# Patient Record
Sex: MALE | Race: WHITE | NOT HISPANIC OR LATINO | Employment: FULL TIME | URBAN - METROPOLITAN AREA
[De-identification: names, ages, dates, MRNs, and addresses within clinical notes are randomized per-mention and may not be internally consistent; named-entity substitution may affect disease eponyms.]

---

## 2012-02-10 LAB — HIV 1&2 EXT: NORMAL

## 2021-09-08 ENCOUNTER — TRANSFERRED RECORDS (OUTPATIENT)
Dept: MULTI SPECIALTY CLINIC | Facility: CLINIC | Age: 51
End: 2021-09-08

## 2023-01-10 ENCOUNTER — OFFICE VISIT (OUTPATIENT)
Dept: FAMILY MEDICINE | Facility: CLINIC | Age: 53
End: 2023-01-10
Payer: COMMERCIAL

## 2023-01-10 VITALS
BODY MASS INDEX: 45.2 KG/M2 | RESPIRATION RATE: 16 BRPM | TEMPERATURE: 98.2 F | HEART RATE: 88 BPM | SYSTOLIC BLOOD PRESSURE: 130 MMHG | HEIGHT: 67 IN | WEIGHT: 288 LBS | OXYGEN SATURATION: 97 % | DIASTOLIC BLOOD PRESSURE: 84 MMHG

## 2023-01-10 DIAGNOSIS — Z13.220 SCREENING FOR HYPERLIPIDEMIA: ICD-10-CM

## 2023-01-10 DIAGNOSIS — E66.01 MORBID OBESITY (H): ICD-10-CM

## 2023-01-10 DIAGNOSIS — I10 ESSENTIAL HYPERTENSION: Primary | ICD-10-CM

## 2023-01-10 DIAGNOSIS — Z13.0 SCREENING FOR DEFICIENCY ANEMIA: ICD-10-CM

## 2023-01-10 DIAGNOSIS — Z12.5 SCREENING FOR PROSTATE CANCER: ICD-10-CM

## 2023-01-10 DIAGNOSIS — Z13.1 SCREENING FOR DIABETES MELLITUS: ICD-10-CM

## 2023-01-10 DIAGNOSIS — R06.83 SNORES: ICD-10-CM

## 2023-01-10 LAB
ALBUMIN SERPL-MCNC: 3.1 G/DL (ref 3.4–5)
ALP SERPL-CCNC: 55 U/L (ref 40–150)
ALT SERPL W P-5'-P-CCNC: 64 U/L (ref 0–70)
ANION GAP SERPL CALCULATED.3IONS-SCNC: 7 MMOL/L (ref 3–14)
AST SERPL W P-5'-P-CCNC: 58 U/L (ref 0–45)
BILIRUB SERPL-MCNC: 0.6 MG/DL (ref 0.2–1.3)
BUN SERPL-MCNC: 20 MG/DL (ref 7–30)
CALCIUM SERPL-MCNC: 9.8 MG/DL (ref 8.5–10.1)
CHLORIDE BLD-SCNC: 96 MMOL/L (ref 94–109)
CHOLEST SERPL-MCNC: 184 MG/DL
CO2 SERPL-SCNC: 33 MMOL/L (ref 20–32)
CREAT SERPL-MCNC: 0.97 MG/DL (ref 0.66–1.25)
ERYTHROCYTE [DISTWIDTH] IN BLOOD BY AUTOMATED COUNT: 12.6 % (ref 10–15)
FASTING STATUS PATIENT QL REPORTED: YES
GFR SERPL CREATININE-BSD FRML MDRD: >90 ML/MIN/1.73M2
GLUCOSE BLD-MCNC: 112 MG/DL (ref 70–99)
HCT VFR BLD AUTO: 46.8 % (ref 40–53)
HDLC SERPL-MCNC: 42 MG/DL
HGB BLD-MCNC: 15.6 G/DL (ref 13.3–17.7)
LDLC SERPL CALC-MCNC: 116 MG/DL
MCH RBC QN AUTO: 30.7 PG (ref 26.5–33)
MCHC RBC AUTO-ENTMCNC: 33.3 G/DL (ref 31.5–36.5)
MCV RBC AUTO: 92 FL (ref 78–100)
NONHDLC SERPL-MCNC: 142 MG/DL
PLATELET # BLD AUTO: 213 10E3/UL (ref 150–450)
POTASSIUM BLD-SCNC: 3.6 MMOL/L (ref 3.4–5.3)
PROT SERPL-MCNC: 7.7 G/DL (ref 6.8–8.8)
PSA SERPL-MCNC: 0.95 UG/L (ref 0–4)
RBC # BLD AUTO: 5.08 10E6/UL (ref 4.4–5.9)
SODIUM SERPL-SCNC: 136 MMOL/L (ref 133–144)
TRIGL SERPL-MCNC: 131 MG/DL
WBC # BLD AUTO: 9.9 10E3/UL (ref 4–11)

## 2023-01-10 PROCEDURE — 80053 COMPREHEN METABOLIC PANEL: CPT | Performed by: PHYSICIAN ASSISTANT

## 2023-01-10 PROCEDURE — 80061 LIPID PANEL: CPT | Performed by: PHYSICIAN ASSISTANT

## 2023-01-10 PROCEDURE — 36415 COLL VENOUS BLD VENIPUNCTURE: CPT | Performed by: PHYSICIAN ASSISTANT

## 2023-01-10 PROCEDURE — G0103 PSA SCREENING: HCPCS | Performed by: PHYSICIAN ASSISTANT

## 2023-01-10 PROCEDURE — 85027 COMPLETE CBC AUTOMATED: CPT | Performed by: PHYSICIAN ASSISTANT

## 2023-01-10 PROCEDURE — 99203 OFFICE O/P NEW LOW 30 MIN: CPT | Performed by: PHYSICIAN ASSISTANT

## 2023-01-10 RX ORDER — LISINOPRIL 20 MG/1
20 TABLET ORAL DAILY
COMMUNITY
Start: 2022-07-06 | End: 2023-01-10

## 2023-01-10 RX ORDER — CHLORTHALIDONE 25 MG/1
25 TABLET ORAL DAILY
COMMUNITY
Start: 2022-07-06 | End: 2023-01-10

## 2023-01-10 RX ORDER — CHLORTHALIDONE 25 MG/1
25 TABLET ORAL DAILY
Qty: 90 TABLET | Refills: 1 | Status: SHIPPED | OUTPATIENT
Start: 2023-01-10 | End: 2023-07-05

## 2023-01-10 RX ORDER — LISINOPRIL 20 MG/1
20 TABLET ORAL DAILY
Qty: 90 TABLET | Refills: 1 | Status: SHIPPED | OUTPATIENT
Start: 2023-01-10 | End: 2023-07-05

## 2023-01-10 ASSESSMENT — PAIN SCALES - GENERAL: PAINLEVEL: NO PAIN (0)

## 2023-01-10 NOTE — PROGRESS NOTES
"  Assessment & Plan     Essential hypertension  Restarting medications, we discussed starting 1 versus 2 meds patient would prefer to go back on both meds.  - Comprehensive metabolic panel; Future  - lisinopril (ZESTRIL) 20 MG tablet; Take 1 tablet (20 mg) by mouth daily Take 20 mg by mouth daily  - chlorthalidone (HYGROTON) 25 MG tablet; Take 1 tablet (25 mg) by mouth daily Take 25 mg by mouth daily  - Comprehensive metabolic panel  Needs labs repeated in 2 weeks and also blood pressure recheck in 2 weeks he will monitor blood pressures at home as well  Screening for diabetes mellitus  Pending  - Comprehensive metabolic panel; Future  - Comprehensive metabolic panel    Screening for prostate cancer  Pending  - Prostate Specific Antigen Screen; Future  - Prostate Specific Antigen Screen    Morbid obesity (H)  We discussed weight loss and the improvements that would make in his blood pressure control and suspected sleep apnea he is going to improve his diet and exercise routinely once again  - Adult Sleep Eval & Management  Referral; Future    Screening for deficiency anemia    - CBC with platelets; Future  - CBC with platelets    Snores  He will be called to schedule  - Adult Sleep Eval & Management  Referral; Future    Screening for hyperlipidemia  Patient prefers lifestyle changes to medications  - Lipid panel reflex to direct LDL Fasting; Future  - Lipid panel reflex to direct LDL Fasting       BMI:   Estimated body mass index is 45.11 kg/m  as calculated from the following:    Height as of this encounter: 1.702 m (5' 7\").    Weight as of this encounter: 130.6 kg (288 lb).   Weight management plan: Discussed healthy diet and exercise guidelines        Return in about 2 weeks (around 1/24/2023) for BP Recheck.    Latricia Jewell PA-C  [facility] ANEL Hickey is a 52 year old, presenting for the following health issues: New patient, he no longer sees previous clinic " due to insurance changes  Hypertension      History of Present Illness       Hypertension: He presents for follow up of hypertension.  He does check blood pressure  regularly outside of the clinic. Outside blood pressures have been over 140/90. He does not follow a low salt diet.       Has been seeing a provider [facility]    First of the year started not feeling the best. Last week it got worse. Lots of aches throughout his body. Friday, 4 days was pretty bed ridden--he had myalgias likely had fevers and sweats. No other symptoms other then just feeling really tired and achy.  He is feeling much improved though still not feeling not him self.  He did 2 COVID test both of which were negative    His Rx for his BP ran out in the fall 2022 and has been taking his BP at home and its been more elevated.   He is interested in restarting his medications.  Was on Lisinopril 20 mg and HCL 25mg  BP was in a normal range.      Concerned about sleep apnea.  He would like to just start a CPAP machine as he is losing weight.  He has never had a sleep study.  His fiancée does notice some apnea.  He apparently wakes up gasping.  He will feel fairly well rested upon waking but then later in the day if he is seated watching TV he may fall asleep.  He does snore as well.    He admits he needs to lose weight.  He states he does focus very hard on weight loss for a period of time, such as a month or 2.  Now he is realizing he really needs to do lifestyle changes long-term.  Last January he reports a 26 pound weight loss with his weight loss he did notice improvements to his sleep quality.  Over the summer he walked 4 miles a day and was feeling well.  He does plan to restart diet and exercise once again        Review of Systems   CONSTITUTIONAL: Positive for sweats and chills and myalgias last week improving now  ENT/MOUTH: NEGATIVE for ear, mouth and throat problems  RESP: NEGATIVE for significant cough or SOB  CV: NEGATIVE for  "chest pain, palpitations or peripheral edema  GI: NEGATIVE for nausea, abdominal pain, heartburn, or change in bowel habits  : negative for dysuria, hematuria, decreased urinary stream, erectile dysfunction        Objective    /84   Pulse 88   Temp 98.2  F (36.8  C) (Temporal)   Resp 16   Ht 1.702 m (5' 7\")   Wt 130.6 kg (288 lb)   SpO2 97%   BMI 45.11 kg/m    Body mass index is 45.11 kg/m .  Physical Exam   GENERAL: healthy, alert and no distress  HENT: ear canals and TM's normal, nose and mouth without ulcers or lesions  NECK: no adenopathy, no asymmetry, masses, or scars and thyroid normal to palpation  RESP: lungs clear to auscultation - no rales, rhonchi or wheezes  CV: regular rate and rhythm, normal S1 S2, no S3 or S4, no murmur, click or rub, no peripheral edema and peripheral pulses strong  ABDOMEN: soft, nontender, no hepatosplenomegaly, no masses and bowel sounds normal  MS: no gross musculoskeletal defects noted, no edema  NEURO: Normal strength and tone, mentation intact and speech normal  PSYCH: mentation appears normal, affect normal/bright    Results for orders placed or performed in visit on 01/10/23   CBC with platelets     Status: Normal   Result Value Ref Range    WBC Count 9.9 4.0 - 11.0 10e3/uL    RBC Count 5.08 4.40 - 5.90 10e6/uL    Hemoglobin 15.6 13.3 - 17.7 g/dL    Hematocrit 46.8 40.0 - 53.0 %    MCV 92 78 - 100 fL    MCH 30.7 26.5 - 33.0 pg    MCHC 33.3 31.5 - 36.5 g/dL    RDW 12.6 10.0 - 15.0 %    Platelet Count 213 150 - 450 10e3/uL                   "

## 2023-01-18 ENCOUNTER — DOCUMENTATION ONLY (OUTPATIENT)
Dept: LAB | Facility: CLINIC | Age: 53
End: 2023-01-18
Payer: COMMERCIAL

## 2023-01-18 DIAGNOSIS — I10 ESSENTIAL HYPERTENSION: Primary | ICD-10-CM

## 2023-01-18 NOTE — PROGRESS NOTES
Patient is coming to lab 1/24 without any available orders. Please place lab orders for patient and sign/renew HMPO.     Thanks,     Taniya HENDERSON) [facility]

## 2023-04-23 ENCOUNTER — HEALTH MAINTENANCE LETTER (OUTPATIENT)
Age: 53
End: 2023-04-23

## 2023-05-18 PROBLEM — E66.01 MORBID OBESITY (H): Chronic | Status: ACTIVE | Noted: 2023-01-10

## 2023-05-18 ASSESSMENT — SLEEP AND FATIGUE QUESTIONNAIRES
HOW LIKELY ARE YOU TO NOD OFF OR FALL ASLEEP WHILE SITTING QUIETLY AFTER LUNCH WITHOUT ALCOHOL: SLIGHT CHANCE OF DOZING
HOW LIKELY ARE YOU TO NOD OFF OR FALL ASLEEP WHILE SITTING AND READING: HIGH CHANCE OF DOZING
HOW LIKELY ARE YOU TO NOD OFF OR FALL ASLEEP IN A CAR, WHILE STOPPED FOR A FEW MINUTES IN TRAFFIC: WOULD NEVER DOZE
HOW LIKELY ARE YOU TO NOD OFF OR FALL ASLEEP WHILE SITTING AND TALKING TO SOMEONE: WOULD NEVER DOZE
HOW LIKELY ARE YOU TO NOD OFF OR FALL ASLEEP WHILE SITTING INACTIVE IN A PUBLIC PLACE: SLIGHT CHANCE OF DOZING
HOW LIKELY ARE YOU TO NOD OFF OR FALL ASLEEP WHILE WATCHING TV: MODERATE CHANCE OF DOZING
HOW LIKELY ARE YOU TO NOD OFF OR FALL ASLEEP WHILE LYING DOWN TO REST IN THE AFTERNOON WHEN CIRCUMSTANCES PERMIT: HIGH CHANCE OF DOZING
HOW LIKELY ARE YOU TO NOD OFF OR FALL ASLEEP WHEN YOU ARE A PASSENGER IN A CAR FOR AN HOUR WITHOUT A BREAK: SLIGHT CHANCE OF DOZING

## 2023-05-19 ENCOUNTER — OFFICE VISIT (OUTPATIENT)
Dept: SLEEP MEDICINE | Facility: CLINIC | Age: 53
End: 2023-05-19
Attending: PHYSICIAN ASSISTANT
Payer: COMMERCIAL

## 2023-05-19 VITALS — OXYGEN SATURATION: 95 % | HEART RATE: 79 BPM | BODY MASS INDEX: 44.67 KG/M2 | HEIGHT: 67 IN | WEIGHT: 284.6 LBS

## 2023-05-19 DIAGNOSIS — G47.30 SLEEP APNEA, UNSPECIFIED TYPE: ICD-10-CM

## 2023-05-19 DIAGNOSIS — R53.83 MALAISE AND FATIGUE: ICD-10-CM

## 2023-05-19 DIAGNOSIS — E66.01 CLASS 3 SEVERE OBESITY DUE TO EXCESS CALORIES WITH SERIOUS COMORBIDITY AND BODY MASS INDEX (BMI) OF 40.0 TO 44.9 IN ADULT (H): ICD-10-CM

## 2023-05-19 DIAGNOSIS — R06.83 SNORES: ICD-10-CM

## 2023-05-19 DIAGNOSIS — R06.89 DYSPNEA AND RESPIRATORY ABNORMALITY: Primary | ICD-10-CM

## 2023-05-19 DIAGNOSIS — Z72.820 LACK OF ADEQUATE SLEEP: ICD-10-CM

## 2023-05-19 DIAGNOSIS — R06.00 DYSPNEA AND RESPIRATORY ABNORMALITY: Primary | ICD-10-CM

## 2023-05-19 DIAGNOSIS — R53.81 MALAISE AND FATIGUE: ICD-10-CM

## 2023-05-19 DIAGNOSIS — E66.813 CLASS 3 SEVERE OBESITY DUE TO EXCESS CALORIES WITH SERIOUS COMORBIDITY AND BODY MASS INDEX (BMI) OF 40.0 TO 44.9 IN ADULT (H): ICD-10-CM

## 2023-05-19 DIAGNOSIS — I10 ESSENTIAL HYPERTENSION: ICD-10-CM

## 2023-05-19 PROCEDURE — 99204 OFFICE O/P NEW MOD 45 MIN: CPT | Performed by: PHYSICIAN ASSISTANT

## 2023-05-19 NOTE — PATIENT INSTRUCTIONS
"Medicare and Medicaid patients starting on CPAP or biPAP on or after 5/12/2023  Medicare patients should schedule an IN PERSON CLINIC appointment to provide your CPAP/biPAP usage data to a provider within 90 days of starting CPAP  Virtual visits are no longer allowed for this visit for Medicare                MY TREATMENT INFORMATION FOR SLEEP APNEA-  Ruperto Mcbride    DOCTOR : Christy Welsh PA    Am I having a sleep study at a sleep center?  --->Due to normal delays, you will be contacted within 2-4 weeks to schedule    Am I having a home sleep study?  --->Watch the video for the device you are using:    -/drop off device-   https://www.[employer].com/watch?v=yGGFBdELGhk    -Disposable device sent out require phone/computer application-   https://www.[employer].com/watch?v=BCce_vbiwxE      Frequently asked questions:  1. What is Obstructive Sleep Apnea (JERE)? JERE is the most common type of sleep apnea. Apnea means, \"without breath.\"  Apnea is most often caused by narrowing or collapse of the upper airway as muscles relax during sleep.   Almost everyone has occasional apneas. Most people with sleep apnea have had brief interruptions at night frequently for many years.  The severity of sleep apnea is related to how frequent and severe the events are.   2. What are the consequences of JERE? Symptoms include: feeling sleepy during the day, snoring loudly, gasping or stopping of breathing, trouble sleeping, and occasionally morning headaches or heartburn at night.  Sleepiness can be serious and even increase the risk of falling asleep while driving. Other health consequences may include development of high blood pressure and other cardiovascular disease in persons who are susceptible. Untreated JERE  can contribute to heart disease, stroke and diabetes.   3. What are the treatment options? In most situations, sleep apnea is a lifelong disease that must be managed with daily therapy. Medications are not effective for " sleep apnea and surgery is generally not considered until other therapies have been tried. Your treatment is your choice . Continuous Positive Airway (CPAP) works right away and is the therapy that is effective in nearly everyone. An oral device to hold your jaw forward is usually the next most reliable option. Other options include postioning devices (to keep you off your back), weight loss, and surgery including a tongue pacing device. There is more detail about some of these options below.  4. Are my sleep studies covered by insurance? Although we will request verification of coverage, we advise you also check in advance of the study to ensure there is coverage.    Important tips for those choosing CPAP and similar devices  For new devices, sign up for device OSVALDO to monitor your device for your followup visits  We encourage you to utilize the [employer] osvaldo or website (myAir web (resmed.com) ) to monitor your therapy progress and share the data with your healthcare team when you discuss your sleep apnea.                                                    Know your equipment:  CPAP is continuous positive airway pressure that prevents obstructive sleep apnea by keeping the throat from collapsing while you are sleeping. In most cases, the device is  smart  and can slowly self-adjusts if your throat collapses and keeps a record every day of how well you are treated-this information is available to you and your care team.  BPAP is bilevel positive airway pressure that keeps your throat open and also assists each breath with a pressure boost to maintain adequate breathing.  Special kinds of BPAP are used in patients who have inadequate breathing from lung or heart disease. In most cases, the device is  smart  and can slowly self-adjusts to assist breathing. Like CPAP, the device keeps a record of how well you are treated.  Your mask is your connection to the device. You get to choose what feels most comfortable  and the staff will help to make sure if fits. Here: are some examples of the different masks that are available:       Key points to remember on your journey with sleep apnea:  Sleep study.  PAP devices often need to be adjusted during a sleep study to show that they are effective and adjusted right.  Good tips to remember: Try wearing just the mask during a quiet time during the day so your body adapts to wearing it. A humidifier is recommended for comfort in most cases to prevent drying of your nose and throat. Allergy medication from your provider may help you if you are having nasal congestion.  Getting settled-in. It takes more than one night for most of us to get used to wearing a mask. Try wearing just the mask during a quiet time during the day so your body adapts to wearing it. A humidifier is recommended for comfort in most cases. Our team will work with you carefully on the first day and will be in contact within 4 days and again at 2 and 4 weeks for advice and remote device adjustments. Your therapy is evaluated by the device each day.   Use it every night. The more you are able to sleep naturally for 7-8 hours, the more likely you will have good sleep and to prevent health risks or symptoms from sleep apnea. Even if you use it 4 hours it helps. Occasionally all of us are unable to use a medical therapy, in sleep apnea, it is not dangerous to miss one night.   Communicate. Call our skilled team on the number provided on the first day if your visit for problems that make it difficult to wear the device. Over 2 out of 3 patients can learn to wear the device long-term with help from our team. Remember to call our team or your sleep providers if you are unable to wear the device as we may have other solutions for those who cannot adapt to mask CPAP therapy. It is recommended that you sleep your sleep provider within the first 3 months and yearly after that if you are not having problems.   Use it for your  health. We encourage use of CPAP masks during daytime quiet periods to allow your face and brain to adapt to the sensation of CPAP so that it will be a more natural sensation to awaken to at night or during naps. This can be very useful during the first few weeks or months of adapting to CPAP though it does not help medically to wear CPAP during wakefulness and  should not be used as a strategy just to meet guidelines.  Take care of your equipment. Make sure you clean your mask and tubing using directions every day and that your filter and mask are replaced as recommended or if they are not working.     BESIDES CPAP, WHAT OTHER THERAPIES ARE THERE?    Positioning Device  Positioning devices are generally used when sleep apnea is mild and only occurs on your back.This example shows a pillow that straps around the waist. It may be appropriate for those whose sleep study shows milder sleep apnea that occurs primarily when lying flat on one's back. Preliminary studies have shown benefit but effectiveness at home may need to be verified by a home sleep test. These devices are generally not covered by medical insurance.  Examples of devices that maintain sleeping on the back to prevent snoring and mild sleep apnea.    Belt type body positioner  http://[employer]/    Electronic reminder  http://nightshifttherapy.com/            Oral Appliance  What is oral appliance therapy?  An oral appliance device fits on your teeth at night like a retainer used after having braces. The device is made by a specialized dentist and requires several visits over 1-2 months before a manufactured device is made to fit your teeth and is adjusted to prevent your sleep apnea. Once an oral device is working properly, snoring should be improved. A home sleep test may be recommended at that time if to determine whether the sleep apnea is adequately treated.       Some things to remember:  -Oral devices are often, but not always, covered by your  medical insurance. Be sure to check with your insurance provider.   -If you are referred for oral therapy, you will be given a list of specialized dentists to consider or you may choose to visit the Web site of the American Academy of Dental Sleep Medicine  -Oral devices are less likely to work if you have severe sleep apnea or are extremely overweight.     More detailed information  An oral appliance is a small acrylic device that fits over the upper and lower teeth  (similar to a retainer or a mouth guard). This device slightly moves jaw forward, which moves the base of the tongue forward, opens the airway, improves breathing for effective treat snoring and obstructive sleep apnea in perhaps 7 out of 10 people .  The best working devices are custom-made by a dental device  after a mold is made of the teeth 1, 2, 3.  When is an oral appliance indicated?  Oral appliance therapy is recommended as a first-line treatment for patients with primary snoring, mild sleep apnea, and for patients with moderate sleep apnea who prefer appliance therapy to use of CPAP4, 5. Severity of sleep apnea is determined by sleep testing and is based on the number of respiratory events per hour of sleep.   How successful is oral appliance therapy?  The success rate of oral appliance therapy in patients with mild sleep apnea is 75-80% while in patients with moderate sleep apnea it is 50-70%. The chance of success in patients with severe sleep apnea is 40-50%. The research also shows that oral appliances have a beneficial effect on the cardiovascular health of JERE patients at the same magnitude as CPAP therapy7.  Oral appliances should be a second-line treatment in cases of severe sleep apnea, but if not completely successful then a combination therapy utilizing CPAP plus oral appliance therapy may be effective. Oral appliances tend to be effective in a broad range of patients although studies show that the patients who have the  highest success are females, younger patients, those with milder disease, and less severe obesity. 3, 6.   Finding a dentist that practices dental sleep medicine  Specific training is available through the American Academy of Dental Sleep Medicine for dentists interested in working in the field of sleep. To find a dentist who is educated in the field of sleep and the use of oral appliances, near you, visit the Web site of the American Academy of Dental Sleep Medicine.    References  1. Brooks, et al. Objectively measured vs self-reported compliance during oral appliance therapy for sleep-disordered breathing. Chest 2013; 144(5): 0854-4627.  2. Km, et al. Objective measurement of compliance during oral appliance therapy for sleep-disordered breathing. Thorax 2013; 68(1): 91-96.  3. Hang et al. Mandibular advancement devices in 620 men and women with JERE and snoring: tolerability and predictors of treatment success. Chest 2004; 125: 2864-6177.  4. Felisa, et al. Oral appliances for snoring and JERE: a review. Sleep 2006; 29: 244-262.  5. Nazia et al. Oral appliance treatment for JERE: an update. J Clin Sleep Med 2014; 10(2): 215-227.  6. Janette et al. Predictors of OSAH treatment outcome. J Dent Res 2007; 86: 5586-1109.      Weight Loss:    Weight loss is a long-term strategy that may improve sleep apnea in some patients.    Weight management is a personal decision and the decision should be based on your interest and the potential benefits.  If you are interested in exploring weight loss strategies, the following discussion covers the impact on weight loss on sleep apnea and the approaches that may be successful.    Being overweight does not necessarily mean you will have health consequences.  Those who have BMI over 35 or over 27 with existing medical conditions carries greater risk.   Weight loss decreases severity of sleep apnea in most people with obesity. For those with mild obesity who  have developed snoring with weight gain, even 15-30 pound weight loss can improve and occasionally eliminate sleep apnea.  Structured and life-long dietary and health habits are necessary to lose weight and keep healthier weight levels.     Though there may be significant health benefits from weight loss, long-term weight loss is very difficult to achieve- studies show success with dietary management in less than 10% of people. In addition, substantial weight loss may require years of dietary control and may be difficult if patients have severe obesity. In these cases, surgical management may be considered.  Finally, older individuals who have tolerated obesity without health complications may be less likely to benefit from weight loss strategies.      [unfilled]    Surgery:    Surgery for obstructive sleep apnea is considered generally only when other therapies fail to work. Surgery may be discussed with you if you are having a difficult time tolerating CPAP and or when there is an abnormal structure that requires surgical correction.  Nose and throat surgeries often enlarge the airway to prevent collapse.  Most of these surgeries create pain for 1-2 weeks and up to half of the most common surgeries are not effective throughout life.  You should carefully discuss the benefits and drawbacks to surgery with your sleep provider and surgeon to determine if it is the best solution for you.   More information  Surgery for JERE is directed at areas that are responsible for narrowing or complete obstruction of the airway during sleep.  There are a wide range of procedures available to enlarge and/or stabilize the airway to prevent blockage of breathing in the three major areas where it can occur: the palate, tongue, and nasal regions.  Successful surgical treatment depends on the accurate identification of the factors responsible for obstructive sleep apnea in each person.  A personalized approach is required because there  is no single treatment that works well for everyone.  Because of anatomic variation, consultation with an examination by a sleep surgeon is a critical first step in determining what surgical options are best for each patient.  In some cases, examination during sedation may be recommended in order to guide the selection of procedures.  Patients will be counseled about risks and benefits as well as the typical recovery course after surgery. Surgery is typically not a cure for a person s JERE.  However, surgery will often significantly improve one s JERE severity (termed  success rate ).  Even in the absence of a cure, surgery will decrease the cardiovascular risk associated with OSA7; improve overall quality of life8 (sleepiness, functionality, sleep quality, etc).      Palate Procedures:  Patients with JERE often have narrowing of their airway in the region of their tonsils and uvula.  The goals of palate procedures are to widen the airway in this region as well as to help the tissues resist collapse.  Modern palate procedure techniques focus on tissue conservation and soft tissue rearrangement, rather than tissue removal.  Often the uvula is preserved in this procedure. Residual sleep apnea is common in patient after pharyngoplasty with an average reduction in sleep apnea events of 33%2.      Tongue Procedures:  ExamWhile patients are awake, the muscles that surround the throat are active and keep this region open for breathing. These muscles relax during sleep, allowing the tongue and other structures to collapse and block breathing.  There are several different tongue procedures available.  Selection of a tongue base procedure depends on characteristics seen on physical exam.  Generally, procedures are aimed at removing bulky tissues in this area or preventing the back of the tongue from falling back during sleep.  Success rates for tongue surgery range from 50-62%3.    Hypoglossal Nerve Stimulation:  Hypoglossal  nerve stimulation has recently received approval from the [place] Food and Drug Administration for the treatment of obstructive sleep apnea.  This is based on research showing that the system was safe and effective in treating sleep apnea6.  Results showed that the median AHI score decreased 68%, from 29.3 to 9.0. This therapy uses an implant system that senses breathing patterns and delivers mild stimulation to airway muscles, which keeps the airway open during sleep.  The system consists of three fully implanted components: a small generator (similar in size to a pacemaker), a breathing sensor, and a stimulation lead.  Using a small handheld remote, a patient turns the therapy on before bed and off upon awakening.    Candidates for this device must be greater than 18 years of age, have moderate to severe JERE (AHI between 15-65), BMI less than 35, have tried CPAP/oral appliance for at least 8 weeks without success, and have appropriate upper airway anatomy (determined by a sleep endoscopy performed by Dr. Lester Alejandra).    Hypoglossal Nerve Stimulation Pathway:    The sleep surgeon s office will work with the patient through the insurance prior-authorization process (including communications and appeals).    Nasal Procedures:  Nasal obstruction can interfere with nasal breathing during the day and night.  Studies have shown that relief of nasal obstruction can improve the ability of some patients to tolerate positive airway pressure therapy for obstructive sleep apnea1.  Treatment options include medications such as nasal saline, topical corticosteroid and antihistamine sprays, and oral medications such as antihistamines or decongestants. Non-surgical treatments can include external nasal dilators for selected patients. If these are not successful by themselves, surgery can improve the nasal airway either alone or in combination with these other options.      Combination Procedures:  Combination of surgical  procedures and other treatments may be recommended, particularly if patients have more than one area of narrowing or persistent positional disease.  The success rate of combination surgery ranges from 66-80%2,3.    References  Hernesto LARA. The Role of the Nose in Snoring and Obstructive Sleep Apnoea: An Update.  Eur Arch Otorhinolaryngol. 2011; 268: 1365-73.   Sandeep SM; Lucero JA; Julia JR; Pallanch JF; Shadia MB; Edenilson SG; Manjinder CARVAJAL. Surgical modifications of the upper airway for obstructive sleep apnea in adults: a systematic review and meta-analysis. SLEEP 2010;33(10):8686-8528. Dari BOUDREAUX. Hypopharyngeal surgery in obstructive sleep apnea: an evidence-based medicine review.  Arch Otolaryngol Head Neck Surg. 2006 Feb;132(2):206-13.  Rajeev YH1, Jazz Y, Mario MEGHNA. The efficacy of anatomically based multilevel surgery for obstructive sleep apnea. Otolaryngol Head Neck Surg. 2003 Oct;129(4):327-35.  Kezirian E, Goldberg A. Hypopharyngeal Surgery in Obstructive Sleep Apnea: An Evidence-Based Medicine Review. Arch Otolaryngol Head Neck Surg. 2006 Feb;132(2):206-13.  John PJ et al. Upper-Airway Stimulation for Obstructive Sleep Apnea.  N Engl J Med. 2014 Jan 9;370(2):139-49.  Samia Y et al. Increased Incidence of Cardiovascular Disease in Middle-aged Men with Obstructive Sleep Apnea. Am J Respir Crit Care Med; 2002 166: 159-165  Padgett EM et al. Studying Life Effects and Effectiveness of Palatopharyngoplasty (SLEEP) study: Subjective Outcomes of Isolated Uvulopalatopharyngoplasty. Otolaryngol Head Neck Surg. 2011; 144: 623-631.        WHAT IF I ONLY HAVE SNORING?    Mandibular advancement devices, lateral sleep positioning, long-term weight loss and treatment of nasal allergies have been shown to improve snoring.  Exercising tongue muscles with a game (https://apps.[employer]/us/osvaldo/soundly-reduce-snoring/nm8369585576) or stimulating the tongue during the day with a device (https://doi.org/10.7020/ibo63222075) have  improved snoring in some individuals.    Remember to Drive Safe... Drive Alive     Sleep health profoundly affects your health, mood, and your safety.  Thirty three percent of the population (one in three of us) is not getting enough sleep and many have a sleep disorder. Not getting enough sleep or having an untreated / undertreated sleep condition may make us sleepy without even knowing it. In fact, our driving could be dramatically impaired due to our sleep health. As your provider, here are some things I would like you to know about driving:     Here are some warning signs for impairment and dangerous drowsy driving:              -Having been awake more than 16 hours               -Looking tired               -Eyelid drooping              -Head nodding (it could be too late at this point)              -Driving for more than 30 minutes     Some things you could do to make the driving safer if you are experiencing some drowsiness:              -Stop driving and rest              -Call for transportation              -Make sure your sleep disorder is adequately treated     Some things that have been shown NOT to work when experiencing drowsiness while driving:              -Turning on the radio              -Opening windows              -Eating any  distracting  /  entertaining  foods (e.g., sunflower seeds, candy, or any other)              -Talking on the phone      Your decision may not only impact your life, but also the life of others. Please, remember to drive safe for yourself and all of us.

## 2023-05-19 NOTE — NURSING NOTE
Writer scheduled patient for HST & follow up with Christy Welsh PA-C. Pt was upset with long wait time for follow up. Writer notified patient he will be placed on cancellation list & will try to get him in sooner than later. Pt accepted response. HST info sent via [employer].

## 2023-05-19 NOTE — PROGRESS NOTES
Outpatient Sleep Medicine Consultation:      Name: Ruperto Mcbride MRN# [id number]   Age: 52 year old YOB: 1970     Date of Consultation: May 19, 2023  Consultation is requested by: Latricia Jewell PA-C  [id number] [place] [address] [id number] Latricia Jewell  Primary care provider: Latricia Jewell       Reason for Sleep Consult:     Ruperto Mcbride is sent by Latricia Jewell for a sleep consultation regarding snoring and sleep apnea.    Patient s Reason for visit  Ruperto Mcbride main reason for visit: Sleep Apnea and frequent waking during the night  Patient states problem(s) started: 12-24 months  Ruperto Mcbride's goals for this visit: To see if a CPAP device or alternative will assist with my sleep apnea issue           Assessment and Plan:     Impression:  Patient has features and risk factors for possible obstructive sleep apnea including: BMI of 44, loud snoring, witnessed apnea, non-refreshing sleep, difficulty maintaining sleep, crowded oropharynx, large neck size and co-morbid HTN. The STOP-BANG score is 8/8.  The pathophysiology, diagnosis and treatment of JERE was discussed and a handout was provided.   Plan:    1. Schedule a Home Sleep Apnea Testing to evaluate for obstructive sleep apnea.    2. Recommend weight management which he is already working on.     Summary Recommendations:  Orders Placed This Encounter   Procedures     HST-Home Sleep Apnea Test - Noxturnal Returnable     Summary Counseling:    Sleep Testing Reviewed  Obstructive Sleep Apnea Reviewed  Complications of Untreated Sleep Apnea Reviewed    Medical Decision-making:   Educational materials provided in instructions    Total time spent reviewing medical records, history and physical examination, review of previous testing and interpretation as well as documentation on this date:49 minutes    CC: Latricia Jewell          History of Present Illness:       SLEEP-WAKE SCHEDULE:     Work/School Days: Patient goes to  school/work: Yes   Usually gets into bed at 11:00 pm  Takes patient about Less than 3 minutes to fall asleep  Has trouble falling asleep Never nights per week  Wakes up in the middle of the night 3 that i am aware of . times.  Wakes up due to Snorting self awake;External stimuli (bed partner, pets, noise, etc);Use the bathroom  He has trouble falling back asleep Never times a week.   It usually takes Less than 3 minutes to get back to sleep  Patient is usually up at 7 AM  Uses alarm: No    Weekends/Non-work Days/All Other Days:  Usually gets into bed at 11 PM   Takes patient about Less than 3 minutes to fall asleep  Patient is usually up at 8 AM  Uses alarm: No    Sleep Need  Patient gets  5/6 hrs sleep on average. He does not feel refreshed.    Patient thinks he needs about 8 hrs sleep    Ruperto Mcbride prefers to sleep in this position(s): Back   Patient states they do the following activities in bed: Use phone, computer, or tablet    Naps  Patient takes a purposeful nap 0.5 times a week and naps are usually 30 minutes in duration  He feels better after a nap: Yes  He dozes off unintentionally 3-4 days per week  Patient has had a driving accident or near-miss due to sleepiness/drowsiness: No      SLEEP DISRUPTIONS:    Breathing/Snoring  Patient snores:Yes  Other people complain about his snoring: Yes  Patient has been told he stops breathing in his sleep:Yes  He has issues with the following: Morning mouth dryness    Movement:  Patient gets pain, discomfort, with an urge to move:  No  It happens when he is resting:  No  It happens more at night:  No  Patient has been told he kicks his legs at night:  No     Behaviors in Sleep:  Ruperto Mcbride has experienced the following behaviors while sleeping:    He has experienced sudden muscle weakness during the day: No      Is there anything else you would like your sleep provider to know: Just want to sleep through the night      CAFFEINE AND OTHER  SUBSTANCES:    Patient consumes caffeinated beverages per day:  0  Last caffeine use is usually: N/A  List of any prescribed or over the counter stimulants that patient takes: Nothing  List of any prescribed or over the counter sleep medication patient takes: N/A  List of previous sleep medications that patient has tried: N/A  Patient drinks alcohol to help them sleep: No  Patient drinks alcohol near bedtime: No    Family History:  Patient has a family member been diagnosed with a sleep disorder: No        SCALES:    EPWORTH SLEEPINESS SCALE         5/18/2023     4:47 PM    [place] Sleepiness Scale ( DONA Fry  1990-1997[facility] [place]/English - Final version - 21 Nov 07 - [facility].)   Sitting and reading High chance of dozing   Watching TV Moderate chance of dozing   Sitting, inactive in a public place (e.g. a theatre or a meeting) Slight chance of dozing   As a passenger in a car for an hour without a break Slight chance of dozing   Lying down to rest in the afternoon when circumstances permit High chance of dozing   Sitting and talking to someone Would never doze   Sitting quietly after a lunch without alcohol Slight chance of dozing   In a car, while stopped for a few minutes in traffic Would never doze   [place] Score (MC) 11   [place] Score (Sleep) 11         INSOMNIA SEVERITY INDEX (RADHA)          5/18/2023     4:35 PM   Insomnia Severity Index (RADHA)   Difficulty falling asleep 0   Difficulty staying asleep 2   Problems waking up too early 2   How SATISFIED/DISSATISFIED are you with your CURRENT sleep pattern? 3   How NOTICEABLE to others do you think your sleep problem is in terms of impairing the quality of your life? 2   How WORRIED/DISTRESSED are you about your current sleep problem? 3   To what extent do you consider your sleep problem to INTERFERE with your daily functioning (e.g. daytime fatigue, mood, ability to function at work/daily chores, concentration, memory, mood, etc.) CURRENTLY? 2  "  RADHA Total Score 14       Guidelines for Scoring/Interpretation:  Total score categories:  0-7 = No clinically significant insomnia   8-14 = Subthreshold insomnia   15-21 = Clinical insomnia (moderate severity)  22-28 = Clinical insomnia (severe)  Used via courtesy of www.myhealth.[place].gov with permission from Munir Byrd PhD., [facility]      STOP BANG         5/19/2023    11:00 AM   STOP BANG Questionnaire (  2008, the American Society of Anesthesiologists, Inc. [employer]   STOP BANG Score (Sleep) 8         GAD7         View : No data to display.                  CAGE-AID         View : No data to display.                CAGE-AID reprinted with permission from the [place] Medical Journal, NELIA Gómez. and TAPAN Ochoa, \"Conjoint screening questionnaires for alcohol and drug abuse\" [place] Medical Journal 94: 135-140, 1995.      PATIENT HEALTH QUESTIONNAIRE-9 (PHQ - 9)         View : No data to display.                Developed by Carlos Travis, Stephanie Valencia, Emerson Pickard and colleagues, with an educational susie from [employer] No permission required to reproduce, translate, display or distribute.        Allergies:    Allergies   Allergen Reactions     Bee Venom Swelling     Other reaction(s): Swelling         Medications:    Current Outpatient Medications   Medication Sig Dispense Refill     chlorthalidone (HYGROTON) 25 MG tablet Take 1 tablet (25 mg) by mouth daily Take 25 mg by mouth daily 90 tablet 1     lisinopril (ZESTRIL) 20 MG tablet Take 1 tablet (20 mg) by mouth daily Take 20 mg by mouth daily 90 tablet 1       Problem List:  Patient Active Problem List    Diagnosis Date Noted     Morbid obesity (H) 01/10/2023     Priority: Medium     Essential hypertension 12/02/2016     Priority: Medium     Erectile dysfunction 06/01/2009     Priority: Medium     Chronic pain of left knee 03/07/2007     Priority: Medium        Past Medical/Surgical History:  No " past medical history on file.  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   Tobacco Use     Smoking status: Never     Passive exposure: Never     Smokeless tobacco: Former     Quit date: 2019   Vaping Use     Vaping status: Never Used   Substance and Sexual Activity     Alcohol use: Not on file     Drug use: Not on file     Sexual activity: Not on file   Other Topics Concern     Not on file   Social History Narrative     Not on file     Social Determinants of Health     Financial Resource Strain: Not on file   Food Insecurity: Not on file   Transportation Needs: Not on file   Physical Activity: Not on file   Stress: Not on file   Social Connections: Not on file   Intimate Partner Violence: Not on file   Housing Stability: Not on file       Family History:  No family history on file.    Review of Systems:  A complete review of systems reviewed by me is negative with the exeption of what has been mentioned in the history of present illness.  In the last TWO WEEKS have you experienced any of the following symptoms?  Fevers: No  Night Sweats: No  Weight Gain: No  Pain at Night: No  Double Vision: No  Changes in Vision: No  Difficulty Breathing through Nose: No  Sore Throat in Morning: No  Dry Mouth in the Morning: Yes  Shortness of Breath Lying Flat: No  Shortness of Breath With Activity: No  Awakening with Shortness of Breath: No  Increased Cough: No  Heart Racing at Night: No  Swelling in Feet or Legs: No  Diarrhea at Night: No  Heartburn at Night: No  Urinating More than Once at Night: No  Losing Control of Urine at Night: No  Joint  "Pains at Night: No  Headaches in Morning: No  Weakness in Arms or Legs: No  Depressed Mood: No  Anxiety: No     Physical Examination:  Vitals: Pulse 79   Ht 1.702 m (5' 7\")   Wt 129.1 kg (284 lb 9.6 oz)   SpO2 95%   BMI 44.57 kg/m    BMI= Body mass index is 44.57 kg/m .    Neck Cir (cm): 48 cm      GENERAL APPEARANCE: healthy and no distress  EYES: Eyes grossly normal to inspection  HENT: oropharynx crowded  NECK: no asymmetry, masses, or scars  NEURO: mentation intact and speech normal  PSYCH: affect normal/bright  Mallampati Class: II.  Tonsillar Stage: 0  surgically removed.         Data: All pertinent previous laboratory data reviewed     Recent Labs   Lab Test 01/10/23  0809      POTASSIUM 3.6   CHLORIDE 96   CO2 33*   ANIONGAP 7   *   BUN 20   CR 0.97   CARLYN 9.8       Recent Labs   Lab Test 01/10/23  0809   WBC 9.9   RBC 5.08   HGB 15.6   HCT 46.8   MCV 92   MCH 30.7   MCHC 33.3   RDW 12.6          Recent Labs   Lab Test 01/10/23  0809   PROTTOTAL 7.7   ALBUMIN 3.1*   BILITOTAL 0.6   ALKPHOS 55   AST 58*   ALT 64         Christy Welsh PA-C 5/19/2023         "

## 2023-05-19 NOTE — NURSING NOTE
"Chief Complaint   Patient presents with     Snoring     Sleep Apnea       Initial There were no vitals taken for this visit. Estimated body mass index is 45.11 kg/m  as calculated from the following:    Height as of 1/10/23: 1.702 m (5' 7\").    Weight as of 1/10/23: 130.6 kg (288 lb).    Medication Reconciliation: complete    Neck circumference: 18.9 inches / 48 centimeters.    Dary Alberto CMA on 5/19/2023 at 11:01 AM   "

## 2023-06-08 ENCOUNTER — TELEPHONE (OUTPATIENT)
Dept: SLEEP MEDICINE | Facility: CLINIC | Age: 53
End: 2023-06-08

## 2023-06-08 NOTE — TELEPHONE ENCOUNTER
Writer spoke with [employer]. Price for HST with resurn visit is approximately $539. Detailed message left on patient's identified voicemail. [employer] number also provided -[phone].     Ning MINOR RN  [facility]

## 2023-06-08 NOTE — TELEPHONE ENCOUNTER
Reason for Call:  Other call back    Detailed comments: patient called and spoke with his insurance.    Then patient called our Billing office this AM.    Patient is not getting information about the cost estimates for consult; home sleep study; or f/u appointments.    Wondering if the clinic has any advise other than these already done for patient.    Please contact patient back.    He is cancelling appointments till get info first.    Thank you.    Phone Number Patient can be reached at: Home number on file [phone] (home)    Best Time: any    Can we leave a detailed message on this number? YES    Call taken on 6/8/2023 at 9:31 AM by Christina Schuster

## 2023-07-05 DIAGNOSIS — I10 ESSENTIAL HYPERTENSION: ICD-10-CM

## 2023-07-05 RX ORDER — LISINOPRIL 20 MG/1
20 TABLET ORAL DAILY
Qty: 90 TABLET | Refills: 1 | Status: SHIPPED | OUTPATIENT
Start: 2023-07-05 | End: 2023-07-24 | Stop reason: DRUGHIGH

## 2023-07-05 RX ORDER — CHLORTHALIDONE 25 MG/1
25 TABLET ORAL DAILY
Qty: 90 TABLET | Refills: 1 | Status: SHIPPED | OUTPATIENT
Start: 2023-07-05 | End: 2024-01-08

## 2023-07-17 ASSESSMENT — ENCOUNTER SYMPTOMS
PALPITATIONS: 0
CONSTIPATION: 1
ARTHRALGIAS: 0
FEVER: 0
HEADACHES: 0
HEMATOCHEZIA: 0
DIZZINESS: 0
ABDOMINAL PAIN: 1
EYE PAIN: 0
MYALGIAS: 0
COUGH: 0
WEAKNESS: 0
SORE THROAT: 0
NAUSEA: 0
DYSURIA: 0
JOINT SWELLING: 0
FREQUENCY: 0
HEARTBURN: 0
CHILLS: 0
PARESTHESIAS: 0
SHORTNESS OF BREATH: 0
HEMATURIA: 0
DIARRHEA: 0
NERVOUS/ANXIOUS: 0

## 2023-07-19 ENCOUNTER — OFFICE VISIT (OUTPATIENT)
Dept: SURGERY | Facility: CLINIC | Age: 53
End: 2023-07-19
Payer: COMMERCIAL

## 2023-07-19 ENCOUNTER — MYC MEDICAL ADVICE (OUTPATIENT)
Dept: FAMILY MEDICINE | Facility: CLINIC | Age: 53
End: 2023-07-19

## 2023-07-19 ENCOUNTER — TELEPHONE (OUTPATIENT)
Dept: SURGERY | Facility: CLINIC | Age: 53
End: 2023-07-19

## 2023-07-19 ENCOUNTER — OFFICE VISIT (OUTPATIENT)
Dept: FAMILY MEDICINE | Facility: CLINIC | Age: 53
End: 2023-07-19
Payer: COMMERCIAL

## 2023-07-19 VITALS
SYSTOLIC BLOOD PRESSURE: 112 MMHG | DIASTOLIC BLOOD PRESSURE: 76 MMHG | HEART RATE: 87 BPM | TEMPERATURE: 98.3 F | BODY MASS INDEX: 39.96 KG/M2 | OXYGEN SATURATION: 94 % | HEIGHT: 67 IN | RESPIRATION RATE: 16 BRPM | WEIGHT: 254.6 LBS

## 2023-07-19 VITALS
HEIGHT: 67 IN | TEMPERATURE: 96.9 F | OXYGEN SATURATION: 96 % | WEIGHT: 257.6 LBS | HEART RATE: 80 BPM | BODY MASS INDEX: 40.43 KG/M2

## 2023-07-19 DIAGNOSIS — L72.3 SEBACEOUS CYST: ICD-10-CM

## 2023-07-19 DIAGNOSIS — Z00.00 ROUTINE GENERAL MEDICAL EXAMINATION AT A HEALTH CARE FACILITY: Primary | ICD-10-CM

## 2023-07-19 DIAGNOSIS — Z13.1 SCREENING FOR DIABETES MELLITUS: ICD-10-CM

## 2023-07-19 DIAGNOSIS — M72.2 PLANTAR FASCIITIS: ICD-10-CM

## 2023-07-19 DIAGNOSIS — Z12.11 SCREEN FOR COLON CANCER: ICD-10-CM

## 2023-07-19 DIAGNOSIS — Z13.220 SCREENING FOR HYPERLIPIDEMIA: ICD-10-CM

## 2023-07-19 DIAGNOSIS — M25.521 RIGHT ELBOW PAIN: ICD-10-CM

## 2023-07-19 DIAGNOSIS — I10 ESSENTIAL HYPERTENSION: ICD-10-CM

## 2023-07-19 DIAGNOSIS — E66.01 MORBID OBESITY (H): ICD-10-CM

## 2023-07-19 DIAGNOSIS — K42.9 RECURRENT UMBILICAL HERNIA: Primary | ICD-10-CM

## 2023-07-19 LAB
ALT SERPL W P-5'-P-CCNC: 29 U/L (ref 0–70)
AST SERPL W P-5'-P-CCNC: 31 U/L (ref 0–45)
CHOLEST SERPL-MCNC: 200 MG/DL
FASTING STATUS PATIENT QL REPORTED: YES
GLUCOSE SERPL-MCNC: 104 MG/DL (ref 70–99)
HDLC SERPL-MCNC: 39 MG/DL
LDLC SERPL CALC-MCNC: 128 MG/DL
NONHDLC SERPL-MCNC: 161 MG/DL
POTASSIUM SERPL-SCNC: 4.1 MMOL/L (ref 3.4–5.3)
TRIGL SERPL-MCNC: 163 MG/DL

## 2023-07-19 PROCEDURE — 36415 COLL VENOUS BLD VENIPUNCTURE: CPT | Performed by: PHYSICIAN ASSISTANT

## 2023-07-19 PROCEDURE — 84450 TRANSFERASE (AST) (SGOT): CPT | Performed by: PHYSICIAN ASSISTANT

## 2023-07-19 PROCEDURE — 99204 OFFICE O/P NEW MOD 45 MIN: CPT | Performed by: SURGERY

## 2023-07-19 PROCEDURE — 84132 ASSAY OF SERUM POTASSIUM: CPT | Performed by: PHYSICIAN ASSISTANT

## 2023-07-19 PROCEDURE — 99214 OFFICE O/P EST MOD 30 MIN: CPT | Mod: 25 | Performed by: PHYSICIAN ASSISTANT

## 2023-07-19 PROCEDURE — 84460 ALANINE AMINO (ALT) (SGPT): CPT | Performed by: PHYSICIAN ASSISTANT

## 2023-07-19 PROCEDURE — 99396 PREV VISIT EST AGE 40-64: CPT | Mod: 25 | Performed by: PHYSICIAN ASSISTANT

## 2023-07-19 PROCEDURE — 80061 LIPID PANEL: CPT | Performed by: PHYSICIAN ASSISTANT

## 2023-07-19 PROCEDURE — 82947 ASSAY GLUCOSE BLOOD QUANT: CPT | Performed by: PHYSICIAN ASSISTANT

## 2023-07-19 ASSESSMENT — ENCOUNTER SYMPTOMS
DYSURIA: 0
WEAKNESS: 0
NAUSEA: 0
PALPITATIONS: 0
EYE PAIN: 0
ARTHRALGIAS: 0
HEMATURIA: 0
MYALGIAS: 0
CONSTIPATION: 1
NERVOUS/ANXIOUS: 0
DIARRHEA: 0
SHORTNESS OF BREATH: 0
SORE THROAT: 0
HEARTBURN: 0
ABDOMINAL PAIN: 1
HEMATOCHEZIA: 0
PARESTHESIAS: 0
CHILLS: 0
DIZZINESS: 0
JOINT SWELLING: 0
COUGH: 0
HEADACHES: 0
FREQUENCY: 0
FEVER: 0

## 2023-07-19 ASSESSMENT — PAIN SCALES - GENERAL
PAINLEVEL: NO PAIN (0)
PAINLEVEL: NO PAIN (0)

## 2023-07-19 NOTE — PROGRESS NOTES
Patient seen in consultation for recurrent umbilical hernia by Latricia Jewell    HPI:  Patient is a 52 year old male with history of emergent umbilical hernia repair in 2020 now with recurrent bulge and pain.  He has actually been losing weight and recently noticed discomfort in his periumbilical area with a bulge that is partially reducible.  Last 30+ pounds in the last few months.  He is not a smoker not a diabetic.  His prior hernia repair surgery was his only abdominal operation.  They did not use mesh.    Review Of Systems    Skin: negative  Ears/Nose/Throat: negative  Respiratory: No shortness of breath, dyspnea on exertion, cough, or hemoptysis  Cardiovascular: negative  Gastrointestinal: negative  Genitourinary: negative  Musculoskeletal: negative  Neurologic: negative  Hematologic/Lymphatic/Immunologic: negative  Endocrine: negative      Past Medical History:   Diagnosis Date    Hypertension 12/2023       Past Surgical History:   Procedure Laterality Date    ABDOMEN SURGERY  12/19/2020    COLONOSCOPY  2021    HERNIA REPAIR  12/19/2020    Umbilical Hernia Repair    KNEE SURGERY      2013    TONSILLECTOMY      UMBILICAL HERNIA REPAIR      2020       History reviewed. No pertinent family history.    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   Tobacco Use    Smoking status: Former     Types: Dip, chew, snus or snuff     Passive exposure: Never    Smokeless tobacco: Former     Quit date: 1/1/2019   Vaping Use    Vaping Use: Never used   Substance and Sexual Activity    Alcohol use: Not Currently     Comment: No alcohol in 75 days    Drug use: Not on file    Sexual activity: Not Currently     Partners: Female     Birth control/protection: Post-menopausal   Other Topics Concern    Not on  "file   Social History Narrative    Not on file     Social Determinants of Health     Financial Resource Strain: Not on file   Food Insecurity: Not on file   Transportation Needs: Not on file   Physical Activity: Not on file   Stress: Not on file   Social Connections: Not on file   Intimate Partner Violence: Not on file   Housing Stability: Not on file       Current Outpatient Medications   Medication Sig Dispense Refill    chlorthalidone (HYGROTON) 25 MG tablet Take 1 tablet (25 mg) by mouth daily Take 25 mg by mouth daily 90 tablet 1    lisinopril (ZESTRIL) 20 MG tablet Take 1 tablet (20 mg) by mouth daily Take 20 mg by mouth daily 90 tablet 1       Medications and history reviewed    Physical exam:  Vitals: Pulse 80   Temp 96.9  F (36.1  C) (Temporal)   Ht 1.702 m (5' 7\")   Wt 116.8 kg (257 lb 9.6 oz)   SpO2 96%   BMI 40.35 kg/m    BMI= Body mass index is 40.35 kg/m .    Constitutional: Healthy, alert, non-distressed   Head: Normo-cephalic, atraumatic, no lesions, masses or tenderness   Cardiovascular: RRR, no new murmurs, +S1, +S2 heart sounds, no clicks, rubs or gallops   Respiratory: CTAB, no rales, rhonchi or wheezing, equal chest rise, good respiratory effort   Gastrointestinal: Soft, non-tender, non distended, umbilical hernia with palpable hernia contents.  Partially reducible.  Mild discomfort with attempts at reduction  : Deferred  Musculoskeletal: Moves all extremities, normal  strength, no deformities noted   Skin: No suspicious lesions or rashes   Psychiatric: Mentation appears normal, affect appropriate   Hematologic/Lymphatic/Immunologic: Normal cervical and supraclavicular lymph nodes   Patient able to get up on table without difficulty.    Labs show:  No results found for this or any previous visit (from the past 24 hour(s)).    Imaging shows:  No results found for this or any previous visit (from the past 744 hour(s)).     Assessment:     ICD-10-CM    1. Recurrent umbilical hernia  " K42.9 Case Request: Robot-assisted laparoscopic recurrent incarcerated umbilical hernia repair with mesh     Case Request: Robot-assisted laparoscopic recurrent incarcerated umbilical hernia repair with mesh        Plan: I recommend robot-assisted laparoscopic recurrent incarcerated umbilical hernia repair with mesh. We discussed the procedure in detail. We also discussed the risks, benefits, alternatives and post-op care and restrictions. After our informed discussion we decided to proceed with the proposed surgery.    Risks of surgery discussed including, but not limited to bleeding, infection, recurrence, damage to nerves and what is in the hernia sac.  Risks of anesthesia also discussed..  Although mesh is a better long term repair if it gets infected it must be removed.  If there is evidence of an infection at time of surgery it will be cancelled and rescheduled for when infection has resolved.  If the patient is a smoker I did discuss increase risk of recurrence and more pain with the cough.  I    Discussed massaging hernia back in and using ice if becomes more painful.  If not able to reduce then go to emergency room.    45 minutes spent by me on the date of the encounter doing chart review, history and exam, documentation and further activities per the note    Aravind Brito, DO

## 2023-07-19 NOTE — PROGRESS NOTES
SUBJECTIVE:   CC: Werner is an 52 year old who presents for preventative health visit.       7/19/2023     8:22 AM   Additional Questions   Roomed by Erendira BERMUDEZ   Accompanied by None     Healthy Habits:     Getting at least 3 servings of Calcium per day:  NO    Bi-annual eye exam:  Yes    Dental care twice a year:  Yes    Sleep apnea or symptoms of sleep apnea:  None    Diet:  Carbohydrate counting    Frequency of exercise:  6-7 days/week    Duration of exercise:  45-60 minutes    Taking medications regularly:  Yes    Medication side effects:  None    Additional concerns today:  Yes    The 10-year ASCVD risk score (Kaycee JIN, et al., 2019) is: 4.6%    Values used to calculate the score:      Age: 52 years      Sex: Male      Is Non- : No      Diabetic: No      Tobacco smoker: No      Systolic Blood Pressure: 112 mmHg      Is BP treated: Yes      HDL Cholesterol: 42 mg/dL      Total Cholesterol: 184 mg/dL       Pt is fasting --he has done a fantastic job losing weight.  He is cutting down on carbs and is avoiding sugar.  He is also exercising routinely.  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  He is down 30 pounds since May.  Blood pressures have improved.  He would like to repeat his labs that we indicated were elevated at his last visit    Joint Pain    Onset: 2 months ago     Description:   Location: right foot- plantar fascitis last fall refs [employer] football he reports his first few steps are always painful or he has more pain upon waking in the morning  Character: Sharp    Intensity: 1-7/10    Progression of Symptoms: better overall he has been doing ice massage and stretching.  He has not been doing night splints or wearing shoes in the house yet.  Overall it is much improved but he wonders what more he can do.    Accompanying Signs & Symptoms:  Other symptoms: none    History:   Previous similar pain: YES       Precipitating factors:   Trauma or overuse: YES    Alleviating factors:  Improved by: ice massage and stretching foot    Therapies Tried and outcome: stretching, ice,       About 1 and half months ago he was lifting a trailer off the hitch when he felt something in his right elbow the pain overall is better but every now and then with certain  or movement such as tossing a football underhand will cause a twinge of pain in his elbow.    He has a cyst on his back or what he believes to be a cyst.  He is got a much smaller 1 and then a larger 1 has not changed a lot but it is noticeable is very indurated and annoying to him.    He will be seeing Dr. Brito this afternoon for an umbilical hernia.  He had this repaired in December 2020 but is worried that it is back    Have you ever done Advance Care Planning? (For example, a Health Directive, POLST, or a discussion with a medical provider or your loved ones about your wishes): No, advance care planning information given to patient to review.  Patient declined advance care planning discussion at this time.    Social History     Tobacco Use     Smoking status: Former     Types: Dip, chew, snus or snuff     Passive exposure: Never     Smokeless tobacco: Former     Quit date: 1/1/2019   Substance Use Topics     Alcohol use: Not Currently     Comment: No alcohol in 75 days             7/17/2023    11:44 AM   Alcohol Use   Prescreen: >3 drinks/day or >7 drinks/week? Not Applicable       Last PSA:   Prostate Specific Antigen Screen   Date Value Ref Range Status   01/10/2023 0.95 0.00 - 4.00 ug/L Final       Reviewed orders with patient. Reviewed health maintenance and updated orders accordingly - Yes  Labs reviewed in EPIC  BP Readings from Last 3 Encounters:   07/19/23 112/76   01/10/23 130/84    Wt Readings from Last 3 Encounters:   07/19/23 116.8 kg (257 lb 9.6 oz)   07/19/23 115.5 kg (254 lb 9.6 oz)   05/19/23 129.1 kg (284 lb 9.6 oz)                  Patient  Active Problem List   Diagnosis     Chronic pain of left knee     Erectile dysfunction     Essential hypertension     Morbid obesity (H)     Past Surgical History:   Procedure Laterality Date     ABDOMEN SURGERY  12/19/2020     COLONOSCOPY  2021     HERNIA REPAIR  12/19/2020    Umbilical Hernia Repair     KNEE SURGERY      2013     TONSILLECTOMY       UMBILICAL HERNIA REPAIR      2020       Social History     Tobacco Use     Smoking status: Former     Types: Dip, chew, snus or snuff     Passive exposure: Never     Smokeless tobacco: Former     Quit date: 1/1/2019   Substance Use Topics     Alcohol use: Not Currently     Comment: No alcohol in 75 days     No family history on file.      Current Outpatient Medications   Medication Sig Dispense Refill     chlorthalidone (HYGROTON) 25 MG tablet Take 1 tablet (25 mg) by mouth daily Take 25 mg by mouth daily 90 tablet 1     lisinopril (ZESTRIL) 20 MG tablet Take 1 tablet (20 mg) by mouth daily Take 20 mg by mouth daily 90 tablet 1     Allergies   Allergen Reactions     Bee Venom Swelling     Other reaction(s): Swelling         Reviewed and updated as needed this visit by clinical staff   Tobacco  Allergies  Meds              Reviewed and updated as needed this visit by Provider                     Review of Systems   Constitutional: Negative for chills and fever.   HENT: Negative for congestion, ear pain, hearing loss and sore throat.    Eyes: Negative for pain and visual disturbance.   Respiratory: Negative for cough and shortness of breath.    Cardiovascular: Negative for chest pain, palpitations and peripheral edema.   Gastrointestinal: Positive for abdominal pain and constipation. Negative for diarrhea, heartburn, hematochezia and nausea.   Genitourinary: Negative for dysuria, frequency, genital sores, hematuria, impotence, penile discharge and urgency.   Musculoskeletal: Negative for arthralgias, joint swelling and myalgias.   Skin: Negative for rash.  "  Neurological: Negative for dizziness, weakness, headaches and paresthesias.   Psychiatric/Behavioral: Negative for mood changes. The patient is not nervous/anxious.      Pain from hernia    OBJECTIVE:   /76 (BP Location: Left arm, Patient Position: Chair, Cuff Size: Adult Large)   Pulse 87   Temp 98.3  F (36.8  C) (Temporal)   Resp 16   Ht 1.702 m (5' 7.01\")   Wt 115.5 kg (254 lb 9.6 oz)   SpO2 94%   BMI 39.87 kg/m      Physical Exam  GENERAL: healthy, alert and no distress  EYES: Eyes grossly normal to inspection, PERRL and conjunctivae and sclerae normal  HENT: ear canals and TM's normal, nose and mouth without ulcers or lesions  NECK: no adenopathy, no asymmetry, masses, or scars and thyroid normal to palpation  RESP: lungs clear to auscultation - no rales, rhonchi or wheezes  CV: regular rate and rhythm, normal S1 S2, no S3 or S4, no murmur, click or rub, no peripheral edema and peripheral pulses strong  ABDOMEN: soft, nontender, no hepatosplenomegaly, no masses and bowel sounds normal  ABDOMEN: Umbilical hernia palpable  MS: no gross musculoskeletal defects noted, no edema  MS: No tenderness to palpation over his heel he is at full range of motion of his ankle, no Achilles tenderness  SKIN: Large indurated subcutaneous mass on his right upper shoulder much smaller 7 to 8 mm lesion inferior and medial to this overlying skin is normal, nontender  NEURO: Normal strength and tone, mentation intact and speech normal  PSYCH: mentation appears normal, affect normal/bright    Diagnostic Test Results:  Labs reviewed in Epic  Results for orders placed or performed in visit on 07/19/23 (from the past 24 hour(s))   Glucose   Result Value Ref Range    Glucose 104 (H) 70 - 99 mg/dL    Patient Fasting > 8hrs? Yes    Lipid panel reflex to direct LDL Fasting   Result Value Ref Range    Cholesterol 200 (H) <200 mg/dL    Triglycerides 163 (H) <150 mg/dL    Direct Measure HDL 39 (L) >=40 mg/dL    LDL Cholesterol " Calculated 128 (H) <=100 mg/dL    Non HDL Cholesterol 161 (H) <130 mg/dL    Narrative    Cholesterol  Desirable:  <200 mg/dL    Triglycerides  Normal:  Less than 150 mg/dL  Borderline High:  150-199 mg/dL  High:  200-499 mg/dL  Very High:  Greater than or equal to 500 mg/dL    Direct Measure HDL  Female:  Greater than or equal to 50 mg/dL   Male:  Greater than or equal to 40 mg/dL    LDL Cholesterol  Desirable:  <100mg/dL  Above Desirable:  100-129 mg/dL   Borderline High:  130-159 mg/dL   High:  160-189 mg/dL   Very High:  >= 190 mg/dL    Non HDL Cholesterol  Desirable:  130 mg/dL  Above Desirable:  130-159 mg/dL  Borderline High:  160-189 mg/dL  High:  190-219 mg/dL  Very High:  Greater than or equal to 220 mg/dL   AST   Result Value Ref Range    AST 31 0 - 45 U/L   ALT   Result Value Ref Range    ALT 29 0 - 70 U/L   Potassium   Result Value Ref Range    Potassium 4.1 3.4 - 5.3 mmol/L       ASSESSMENT/PLAN:   (Z00.00) Routine general medical examination at a health care facility  (primary encounter diagnosis)  Comment: Recommend routine annual visits  Plan: Colonoscopy is up-to-date, it is due September 2024 this has been sent to abstracting    (I10) Essential hypertension  Comment: At goal, will continue to monitor blood pressure as he loses weight may need to reduce his blood pressure dosage  Plan: Potassium            (L72.3) Sebaceous cyst  Comment: Can refer to general surgery versus dermatology for excision  Plan: Patient is meeting with surgeon this afternoon he will stroke this to him for his opinion    (E66.01) Morbid obesity (H)  Comment: He is working very hard with diet and exercise to lose weight, he is already lost 30 pounds in 2 months weight loss will help to improve his hypertensive control, we may be able to eliminate one of his medications  Plan: Encouraged continued improvements to his lifestyle    (M25.521) Right elbow pain  Comment: For now he will ice and observe  Plan: Consider physical  "therapy if not improving    (M72.2) Plantar fasciitis  Comment: He will wear night splints, and avoid walking barefoot at home always wearing supportive shoes  Plan: Can message me for referral to podiatry if needed    (Z13.1) Screening for diabetes mellitus  Comment:   Plan: Glucose            (Z13.220) Screening for hyperlipidemia  Comment:   Plan: Lipid panel reflex to direct LDL Fasting, AST,         ALT            (Z12.11) Screen for colon cancer  Comment:   Plan:     Patient has been advised of split billing requirements and indicates understanding: Yes      COUNSELING:   Reviewed preventive health counseling, as reflected in patient instructions      BMI:   Estimated body mass index is 39.87 kg/m  as calculated from the following:    Height as of this encounter: 1.702 m (5' 7.01\").    Weight as of this encounter: 115.5 kg (254 lb 9.6 oz).   Weight management plan: Discussed healthy diet and exercise guidelines      He reports that he has quit smoking. His smoking use included dip, chew, snus or snuff. He has never been exposed to tobacco smoke. He quit smokeless tobacco use about 4 years ago.            Latricia Jewell PA-C  [employer]  "

## 2023-07-19 NOTE — LETTER
7/19/2023         RE: Ruperto Mcbride  [id number] [facility] [address]        Dear Colleague,    Thank you for referring your patient, Ruperto Mcbride, to the [facility]. Please see a copy of my visit note below.    Patient seen in consultation for recurrent umbilical hernia by Latricia Jewell    HPI:  Patient is a 52 year old male with history of emergent umbilical hernia repair in 2020 now with recurrent bulge and pain.  He has actually been losing weight and recently noticed discomfort in his periumbilical area with a bulge that is partially reducible.  Last 30+ pounds in the last few months.  He is not a smoker not a diabetic.  His prior hernia repair surgery was his only abdominal operation.  They did not use mesh.    Review Of Systems    Skin: negative  Ears/Nose/Throat: negative  Respiratory: No shortness of breath, dyspnea on exertion, cough, or hemoptysis  Cardiovascular: negative  Gastrointestinal: negative  Genitourinary: negative  Musculoskeletal: negative  Neurologic: negative  Hematologic/Lymphatic/Immunologic: negative  Endocrine: negative      Past Medical History:   Diagnosis Date     Hypertension 12/2023       Past Surgical History:   Procedure Laterality Date     ABDOMEN SURGERY  12/19/2020     COLONOSCOPY  2021     HERNIA REPAIR  12/19/2020    Umbilical Hernia Repair     KNEE SURGERY      2013     TONSILLECTOMY       UMBILICAL HERNIA REPAIR      2020       History reviewed. No pertinent family history.    Social History     Socioeconomic History     Marital status: Single     Spouse name: Not on file     Number of children: Not on file     Years of education: Not on file     Highest education level: Not on file   Occupational History     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   Tobacco Use     Smoking status: Former     Types: Dip, chew, snus or snuff     Passive exposure: Never     Smokeless tobacco: Former     Quit date: 1/1/2019   Vaping Use     Vaping Use: Never  "used   Substance and Sexual Activity     Alcohol use: Not Currently     Comment: No alcohol in 75 days     Drug use: Not on file     Sexual activity: Not Currently     Partners: Female     Birth control/protection: Post-menopausal   Other Topics Concern     Not on file   Social History Narrative     Not on file     Social Determinants of Health     Financial Resource Strain: Not on file   Food Insecurity: Not on file   Transportation Needs: Not on file   Physical Activity: Not on file   Stress: Not on file   Social Connections: Not on file   Intimate Partner Violence: Not on file   Housing Stability: Not on file       Current Outpatient Medications   Medication Sig Dispense Refill     chlorthalidone (HYGROTON) 25 MG tablet Take 1 tablet (25 mg) by mouth daily Take 25 mg by mouth daily 90 tablet 1     lisinopril (ZESTRIL) 20 MG tablet Take 1 tablet (20 mg) by mouth daily Take 20 mg by mouth daily 90 tablet 1       Medications and history reviewed    Physical exam:  Vitals: Pulse 80   Temp 96.9  F (36.1  C) (Temporal)   Ht 1.702 m (5' 7\")   Wt 116.8 kg (257 lb 9.6 oz)   SpO2 96%   BMI 40.35 kg/m    BMI= Body mass index is 40.35 kg/m .    Constitutional: Healthy, alert, non-distressed   Head: Normo-cephalic, atraumatic, no lesions, masses or tenderness   Cardiovascular: RRR, no new murmurs, +S1, +S2 heart sounds, no clicks, rubs or gallops   Respiratory: CTAB, no rales, rhonchi or wheezing, equal chest rise, good respiratory effort   Gastrointestinal: Soft, non-tender, non distended, umbilical hernia with palpable hernia contents.  Partially reducible.  Mild discomfort with attempts at reduction  : Deferred  Musculoskeletal: Moves all extremities, normal  strength, no deformities noted   Skin: No suspicious lesions or rashes   Psychiatric: Mentation appears normal, affect appropriate   Hematologic/Lymphatic/Immunologic: Normal cervical and supraclavicular lymph nodes   Patient able to get up on table without " difficulty.    Labs show:  No results found for this or any previous visit (from the past 24 hour(s)).    Imaging shows:  No results found for this or any previous visit (from the past 744 hour(s)).     Assessment:     ICD-10-CM    1. Recurrent umbilical hernia  K42.9 Case Request: Robot-assisted laparoscopic recurrent incarcerated umbilical hernia repair with mesh     Case Request: Robot-assisted laparoscopic recurrent incarcerated umbilical hernia repair with mesh        Plan: I recommend robot-assisted laparoscopic recurrent incarcerated umbilical hernia repair with mesh. We discussed the procedure in detail. We also discussed the risks, benefits, alternatives and post-op care and restrictions. After our informed discussion we decided to proceed with the proposed surgery.    Risks of surgery discussed including, but not limited to bleeding, infection, recurrence, damage to nerves and what is in the hernia sac.  Risks of anesthesia also discussed..  Although mesh is a better long term repair if it gets infected it must be removed.  If there is evidence of an infection at time of surgery it will be cancelled and rescheduled for when infection has resolved.  If the patient is a smoker I did discuss increase risk of recurrence and more pain with the cough.  I    Discussed massaging hernia back in and using ice if becomes more painful.  If not able to reduce then go to emergency room.    45 minutes spent by me on the date of the encounter doing chart review, history and exam, documentation and further activities per the note    Aravind Brito DO      Again, thank you for allowing me to participate in the care of your patient.        Sincerely,        Aravind Brito DO

## 2023-07-19 NOTE — TELEPHONE ENCOUNTER
Patient has seen test results for glucose, lipids, AST, ALT, and potassium. Provider has not yet reviewed results.     Would you like an appointment scheduled to discuss per patient request for a phone call to discuss results or are you able to call patient for review of results.     MARCOS SifuentesN, RN  [facility] ~ Registered Nurse  Clinic Triage ~ [place] [facility] [place]  July 19, 2023

## 2023-07-19 NOTE — TELEPHONE ENCOUNTER
Type of surgery: Robot-assisted laparoscopic recurrent incarcerated umbilical hernia repair with mesh    Location of surgery: [facility]  Date and time of surgery: 8/18  Surgeon: Alisha  Pre-Op Appt Date: 7/24  Post-Op Appt Date: 8/31   Packet sent out: Yes  Pre-cert/Authorization completed:  Not Applicable  Date: na

## 2023-07-20 NOTE — TELEPHONE ENCOUNTER
Patient requesting a call back, would you like an appointment scheduled or are you able to call to discuss?     MARCOS SifuentesN, RN  [facility] ~ Registered Nurse  Clinic Triage ~ [place] [facility] [place]  July 20, 2023

## 2023-07-20 NOTE — TELEPHONE ENCOUNTER
I returned his call. Discussed his diet.  Continue his ground turkey/chicken tuna as protein eat less cheese  He still plans to do low carb  and consider whole food carbs  Latricia Jewell PA-C

## 2023-07-20 NOTE — TELEPHONE ENCOUNTER
I did call and left him a message yesterday, then sent him a [employer] message with his results.  I asked him to let me know if he had further questions  Latricia Jewell PA-C

## 2023-07-24 ENCOUNTER — OFFICE VISIT (OUTPATIENT)
Dept: FAMILY MEDICINE | Facility: CLINIC | Age: 53
End: 2023-07-24
Payer: COMMERCIAL

## 2023-07-24 VITALS
HEIGHT: 67 IN | SYSTOLIC BLOOD PRESSURE: 110 MMHG | DIASTOLIC BLOOD PRESSURE: 70 MMHG | TEMPERATURE: 97.9 F | BODY MASS INDEX: 39.74 KG/M2 | HEART RATE: 72 BPM | WEIGHT: 253.2 LBS | OXYGEN SATURATION: 97 % | RESPIRATION RATE: 16 BRPM

## 2023-07-24 DIAGNOSIS — Z01.818 PREOP GENERAL PHYSICAL EXAM: Primary | ICD-10-CM

## 2023-07-24 DIAGNOSIS — I10 ESSENTIAL HYPERTENSION: ICD-10-CM

## 2023-07-24 DIAGNOSIS — E66.01 MORBID OBESITY (H): ICD-10-CM

## 2023-07-24 DIAGNOSIS — K42.9 RECURRENT UMBILICAL HERNIA: ICD-10-CM

## 2023-07-24 PROCEDURE — 99214 OFFICE O/P EST MOD 30 MIN: CPT | Performed by: PHYSICIAN ASSISTANT

## 2023-07-24 RX ORDER — LISINOPRIL 10 MG/1
10 TABLET ORAL DAILY
Qty: 90 TABLET | Refills: 3 | Status: SHIPPED | OUTPATIENT
Start: 2023-07-24 | End: 2024-07-01

## 2023-07-24 ASSESSMENT — PAIN SCALES - GENERAL: PAINLEVEL: NO PAIN (0)

## 2023-07-24 NOTE — PROGRESS NOTES
[facility] [place]  [address], [address]  [place] [address]  Phone: [phone]  Fax: [phone]  Primary Provider: Apurva Pizarro  Pre-op Performing Provider: APURVA PIZARRO      PREOPERATIVE EVALUATION:  Today's date: 7/24/2023    Ruperto Mcbride is a 52 year old male who presents for a preoperative evaluation.      7/24/2023     2:26 PM   Additional Questions   Roomed by Salina TIDWELL CMA   Accompanied by SELF         7/24/2023     2:26 PM   Patient Reported Additional Medications   Patient reports taking the following new medications n/a     Surgical Information:  Surgery/Procedure: Robot-assisted laparoscopic recurrent incarcerated umbilical hernia repair with mesh   Surgery Location:  [facility]  Surgeon: Aravind Brito   Surgery Date: 08/18/2023  Time of Surgery: TBD   Where patient plans to recover: at Home   Fax number for surgical facility: Note does not need to be faxed, will be available electronically in Epic.    Assessment & Plan     The proposed surgical procedure is considered INTERMEDIATE risk.    Preop general physical exam  Patient is optimized for procedure as above he is able to complete 4 METS of cardiac activity    Recurrent umbilical hernia  Scheduled for procedure as above    Essential hypertension  At goal, in fact he is having some dizziness with change of position he has requested reducing his blood pressure dosage of lisinopril to 10 mg currently taking 20.  We will reduce him to 10 mg as he is losing weight he will continue to monitor and alert me if it is 140/90 or higher  - Basic metabolic panel  (Ca, Cl, CO2, Creat, Gluc, K, Na, BUN); Future  - lisinopril (ZESTRIL) 10 MG tablet; Take 1 tablet (10 mg) by mouth daily    Morbid obesity (H)  He will continue to improve his diet and exercise in order to achieve weight loss.  Weight loss will help to improve his essential hypertensive control.  I will also help to improve his fatty liver and  elevated blood sugars           Risks and Recommendations:  The patient has the following additional risks and recommendations for perioperative complications:  Obstructive Sleep Apnea:   He did not complete the sleep study to diagnose him with sleep apnea, he is losing weight and is using an oral appliance, ever since his significant other has not noticed any snoring.  Please monitor him for O2 desaturations while under anesthesia and in postop    Antiplatelet or Anticoagulation Medication Instructions:   - Patient is on no antiplatelet or anticoagulation medications.    Additional Medication Instructions:  Patient is to take all scheduled medications on the day of surgery EXCEPT for modifications listed below:   - ACE/ARB: HOLD on day of surgery (minimum 11 hours for general anesthesia).   - Diuretics: HOLD on the day of surgery.    RECOMMENDATION:  APPROVAL GIVEN to proceed with proposed procedure, without further diagnostic evaluation.      Subjective       HPI related to upcoming procedure: Patient has a recurrent umbilical hernia.  Was initially repaired in 2020.  He started to be concerned that it has reoccurred a bulge.        7/24/2023     2:17 PM   Preop Questions   1. Have you ever had a heart attack or stroke? No   2. Have you ever had surgery on your heart or blood vessels, such as a stent placement, a coronary artery bypass, or surgery on an artery in your head, neck, heart, or legs? No   3. Do you have chest pain with activity? No   4. Do you have a history of  heart failure? No   5. Do you currently have a cold, bronchitis or symptoms of other infection? No   6. Do you have a cough, shortness of breath, or wheezing? No   7. Do you or anyone in your family have previous history of blood clots? No   8. Do you or does anyone in your family have a serious bleeding problem such as prolonged bleeding following surgeries or cuts? No   9. Have you ever had problems with anemia or been told to take iron  pills? No   10. Have you had any abnormal blood loss such as black, tarry or bloody stools? No   11. Have you ever had a blood transfusion? No   12. Are you willing to have a blood transfusion if it is medically needed before, during, or after your surgery? Yes    13. Have you or any of your relatives ever had problems with anesthesia? No   14. Do you have sleep apnea, excessive snoring or daytime drowsiness? He saw sleep medicine and sleep study was ordered but not completed due to insurance coverage. He is using a mouth guard to improve his snoring and it has helped he is also losing weight.   15. Do you have any artifical heart valves or other implanted medical devices like a pacemaker, defibrillator, or continuous glucose monitor? No   16. Do you have artificial joints? No   17. Are you allergic to latex? No       Health Care Directive:  Patient does not have a Health Care Directive or Living Will: Discussed advance care planning with patient; however, patient declined at this time.    Preoperative Review of :   reviewed - no record of controlled substances prescribed.      Status of Chronic Conditions:  See problem list for active medical problems.  Problems all longstanding and stable, except as noted/documented.  See ROS for pertinent symptoms related to these conditions.    HYPERTENSION - Patient has longstanding history of HTN , currently denies any symptoms referable to elevated blood pressure. Specifically denies chest pain, palpitations, dyspnea, orthopnea, PND or peripheral edema. Blood pressure readings have been in normal range. Current medication regimen is as listed below. Patient denies any side effects of medication.     Review of Systems  CONSTITUTIONAL: NEGATIVE for fever, chills, change in weight  INTEGUMENTARY/SKIN: NEGATIVE for worrisome rashes, moles or lesions  EYES: NEGATIVE for vision changes or irritation  ENT/MOUTH: NEGATIVE for ear, mouth and throat problems  RESP: NEGATIVE for  "significant cough or SOB  CV: NEGATIVE for chest pain, palpitations or peripheral edema  GI: POSITIVE for umbilical hernia  : NEGATIVE for frequency, dysuria, or hematuria  MUSCULOSKELETAL: NEGATIVE for significant arthralgias or myalgia  NEURO: NEGATIVE for weakness, dizziness or paresthesias  ENDOCRINE: NEGATIVE for temperature intolerance, skin/hair changes  HEME: NEGATIVE for bleeding problems  PSYCHIATRIC: NEGATIVE for changes in mood or affect    Patient Active Problem List    Diagnosis Date Noted    Morbid obesity (H) 01/10/2023     Priority: Medium    Essential hypertension 12/02/2016     Priority: Medium    Erectile dysfunction 06/01/2009     Priority: Medium    Chronic pain of left knee 03/07/2007     Priority: Medium      Past Medical History:   Diagnosis Date    Hypertension 12/2023     Past Surgical History:   Procedure Laterality Date    ABDOMEN SURGERY  12/19/2020    COLONOSCOPY  2021    HERNIA REPAIR  12/19/2020    Umbilical Hernia Repair    KNEE SURGERY      2013    TONSILLECTOMY      UMBILICAL HERNIA REPAIR      2020     Current Outpatient Medications   Medication Sig Dispense Refill    chlorthalidone (HYGROTON) 25 MG tablet Take 1 tablet (25 mg) by mouth daily Take 25 mg by mouth daily 90 tablet 1    lisinopril (ZESTRIL) 20 MG tablet Take 1 tablet (20 mg) by mouth daily Take 20 mg by mouth daily 90 tablet 1       Allergies   Allergen Reactions    Bee Venom Swelling     Other reaction(s): Swelling          Social History     Tobacco Use    Smoking status: Former     Types: Dip, chew, snus or snuff     Passive exposure: Never    Smokeless tobacco: Former     Quit date: 1/1/2019   Substance Use Topics    Alcohol use: Not Currently     Comment: No alcohol in 75 days       History   Drug Use Not on file         Objective     /70   Pulse 72   Temp 97.9  F (36.6  C)   Resp 16   Ht 1.695 m (5' 6.75\")   Wt 114.9 kg (253 lb 3.2 oz)   SpO2 97%   BMI 39.95 kg/m      Physical Exam    GENERAL " APPEARANCE: healthy, alert and no distress     EYES: EOMI,  PERRL     HENT: ear canals and TM's normal and nose and mouth without ulcers or lesions     NECK: no adenopathy, no asymmetry, masses, or scars and thyroid normal to palpation     RESP: lungs clear to auscultation - no rales, rhonchi or wheezes     CV: regular rates and rhythm, normal S1 S2, no S3 or S4 and no murmur, click or rub     ABDOMEN: soft, nontender, without hepatosplenomegaly  and umbilical hernia noted     MS: extremities normal- no gross deformities noted, no evidence of inflammation in joints, FROM in all extremities.     SKIN: no suspicious lesions or rashes     NEURO: Normal strength and tone, sensory exam grossly normal, mentation intact and speech normal     PSYCH: mentation appears normal. and affect normal/bright     LYMPHATICS: No cervical adenopathy    Recent Labs   Lab Test 07/19/23  0927 01/10/23  0809   HGB  --  15.6   PLT  --  213   NA  --  136   POTASSIUM 4.1 3.6   CR  --  0.97        Diagnostics:  Recent Results (from the past 168 hour(s))   Glucose    Collection Time: 07/19/23  9:27 AM   Result Value Ref Range    Glucose 104 (H) 70 - 99 mg/dL    Patient Fasting > 8hrs? Yes    Lipid panel reflex to direct LDL Fasting    Collection Time: 07/19/23  9:27 AM   Result Value Ref Range    Cholesterol 200 (H) <200 mg/dL    Triglycerides 163 (H) <150 mg/dL    Direct Measure HDL 39 (L) >=40 mg/dL    LDL Cholesterol Calculated 128 (H) <=100 mg/dL    Non HDL Cholesterol 161 (H) <130 mg/dL   AST    Collection Time: 07/19/23  9:27 AM   Result Value Ref Range    AST 31 0 - 45 U/L   ALT    Collection Time: 07/19/23  9:27 AM   Result Value Ref Range    ALT 29 0 - 70 U/L   Potassium    Collection Time: 07/19/23  9:27 AM   Result Value Ref Range    Potassium 4.1 3.4 - 5.3 mmol/L     We will be repeating a basic panel early in August.  Results should be available prior to the procedure  No EKG required, no history of coronary heart disease,  significant arrhythmia, peripheral arterial disease or other structural heart disease.    Revised Cardiac Risk Index (RCRI):  The patient has the following serious cardiovascular risks for perioperative complications:   - No serious cardiac risks = 0 points     RCRI Interpretation: 0 points: Class I (very low risk - 0.4% complication rate)         Signed Electronically by: Latricia Jewell PA-C  Copy of this evaluation report is provided to requesting physician.

## 2023-07-24 NOTE — H&P (VIEW-ONLY)
[facility] [place]  [address], [address]  [place] [address]  Phone: [phone]  Fax: [phone]  Primary Provider: Apurva Pizarro  Pre-op Performing Provider: APURVA PIZARRO      PREOPERATIVE EVALUATION:  Today's date: 7/24/2023    Ruperto Mcbride is a 52 year old male who presents for a preoperative evaluation.      7/24/2023     2:26 PM   Additional Questions   Roomed by Salina TIDWELL CMA   Accompanied by SELF         7/24/2023     2:26 PM   Patient Reported Additional Medications   Patient reports taking the following new medications n/a     Surgical Information:  Surgery/Procedure: Robot-assisted laparoscopic recurrent incarcerated umbilical hernia repair with mesh   Surgery Location:  [facility]  Surgeon: Aravind Brito   Surgery Date: 08/18/2023  Time of Surgery: TBD   Where patient plans to recover: at Home   Fax number for surgical facility: Note does not need to be faxed, will be available electronically in Epic.    Assessment & Plan     The proposed surgical procedure is considered INTERMEDIATE risk.    Preop general physical exam  Patient is optimized for procedure as above he is able to complete 4 METS of cardiac activity    Recurrent umbilical hernia  Scheduled for procedure as above    Essential hypertension  At goal, in fact he is having some dizziness with change of position he has requested reducing his blood pressure dosage of lisinopril to 10 mg currently taking 20.  We will reduce him to 10 mg as he is losing weight he will continue to monitor and alert me if it is 140/90 or higher  - Basic metabolic panel  (Ca, Cl, CO2, Creat, Gluc, K, Na, BUN); Future  - lisinopril (ZESTRIL) 10 MG tablet; Take 1 tablet (10 mg) by mouth daily    Morbid obesity (H)  He will continue to improve his diet and exercise in order to achieve weight loss.  Weight loss will help to improve his essential hypertensive control.  I will also help to improve his fatty liver and  elevated blood sugars           Risks and Recommendations:  The patient has the following additional risks and recommendations for perioperative complications:  Obstructive Sleep Apnea:   He did not complete the sleep study to diagnose him with sleep apnea, he is losing weight and is using an oral appliance, ever since his significant other has not noticed any snoring.  Please monitor him for O2 desaturations while under anesthesia and in postop    Antiplatelet or Anticoagulation Medication Instructions:   - Patient is on no antiplatelet or anticoagulation medications.    Additional Medication Instructions:  Patient is to take all scheduled medications on the day of surgery EXCEPT for modifications listed below:   - ACE/ARB: HOLD on day of surgery (minimum 11 hours for general anesthesia).   - Diuretics: HOLD on the day of surgery.    RECOMMENDATION:  APPROVAL GIVEN to proceed with proposed procedure, without further diagnostic evaluation.      Subjective       HPI related to upcoming procedure: Patient has a recurrent umbilical hernia.  Was initially repaired in 2020.  He started to be concerned that it has reoccurred a bulge.        7/24/2023     2:17 PM   Preop Questions   1. Have you ever had a heart attack or stroke? No   2. Have you ever had surgery on your heart or blood vessels, such as a stent placement, a coronary artery bypass, or surgery on an artery in your head, neck, heart, or legs? No   3. Do you have chest pain with activity? No   4. Do you have a history of  heart failure? No   5. Do you currently have a cold, bronchitis or symptoms of other infection? No   6. Do you have a cough, shortness of breath, or wheezing? No   7. Do you or anyone in your family have previous history of blood clots? No   8. Do you or does anyone in your family have a serious bleeding problem such as prolonged bleeding following surgeries or cuts? No   9. Have you ever had problems with anemia or been told to take iron  pills? No   10. Have you had any abnormal blood loss such as black, tarry or bloody stools? No   11. Have you ever had a blood transfusion? No   12. Are you willing to have a blood transfusion if it is medically needed before, during, or after your surgery? Yes    13. Have you or any of your relatives ever had problems with anesthesia? No   14. Do you have sleep apnea, excessive snoring or daytime drowsiness? He saw sleep medicine and sleep study was ordered but not completed due to insurance coverage. He is using a mouth guard to improve his snoring and it has helped he is also losing weight.   15. Do you have any artifical heart valves or other implanted medical devices like a pacemaker, defibrillator, or continuous glucose monitor? No   16. Do you have artificial joints? No   17. Are you allergic to latex? No       Health Care Directive:  Patient does not have a Health Care Directive or Living Will: Discussed advance care planning with patient; however, patient declined at this time.    Preoperative Review of :   reviewed - no record of controlled substances prescribed.      Status of Chronic Conditions:  See problem list for active medical problems.  Problems all longstanding and stable, except as noted/documented.  See ROS for pertinent symptoms related to these conditions.    HYPERTENSION - Patient has longstanding history of HTN , currently denies any symptoms referable to elevated blood pressure. Specifically denies chest pain, palpitations, dyspnea, orthopnea, PND or peripheral edema. Blood pressure readings have been in normal range. Current medication regimen is as listed below. Patient denies any side effects of medication.     Review of Systems  CONSTITUTIONAL: NEGATIVE for fever, chills, change in weight  INTEGUMENTARY/SKIN: NEGATIVE for worrisome rashes, moles or lesions  EYES: NEGATIVE for vision changes or irritation  ENT/MOUTH: NEGATIVE for ear, mouth and throat problems  RESP: NEGATIVE for  "significant cough or SOB  CV: NEGATIVE for chest pain, palpitations or peripheral edema  GI: POSITIVE for umbilical hernia  : NEGATIVE for frequency, dysuria, or hematuria  MUSCULOSKELETAL: NEGATIVE for significant arthralgias or myalgia  NEURO: NEGATIVE for weakness, dizziness or paresthesias  ENDOCRINE: NEGATIVE for temperature intolerance, skin/hair changes  HEME: NEGATIVE for bleeding problems  PSYCHIATRIC: NEGATIVE for changes in mood or affect    Patient Active Problem List    Diagnosis Date Noted    Morbid obesity (H) 01/10/2023     Priority: Medium    Essential hypertension 12/02/2016     Priority: Medium    Erectile dysfunction 06/01/2009     Priority: Medium    Chronic pain of left knee 03/07/2007     Priority: Medium      Past Medical History:   Diagnosis Date    Hypertension 12/2023     Past Surgical History:   Procedure Laterality Date    ABDOMEN SURGERY  12/19/2020    COLONOSCOPY  2021    HERNIA REPAIR  12/19/2020    Umbilical Hernia Repair    KNEE SURGERY      2013    TONSILLECTOMY      UMBILICAL HERNIA REPAIR      2020     Current Outpatient Medications   Medication Sig Dispense Refill    chlorthalidone (HYGROTON) 25 MG tablet Take 1 tablet (25 mg) by mouth daily Take 25 mg by mouth daily 90 tablet 1    lisinopril (ZESTRIL) 20 MG tablet Take 1 tablet (20 mg) by mouth daily Take 20 mg by mouth daily 90 tablet 1       Allergies   Allergen Reactions    Bee Venom Swelling     Other reaction(s): Swelling          Social History     Tobacco Use    Smoking status: Former     Types: Dip, chew, snus or snuff     Passive exposure: Never    Smokeless tobacco: Former     Quit date: 1/1/2019   Substance Use Topics    Alcohol use: Not Currently     Comment: No alcohol in 75 days       History   Drug Use Not on file         Objective     /70   Pulse 72   Temp 97.9  F (36.6  C)   Resp 16   Ht 1.695 m (5' 6.75\")   Wt 114.9 kg (253 lb 3.2 oz)   SpO2 97%   BMI 39.95 kg/m      Physical Exam    GENERAL " APPEARANCE: healthy, alert and no distress     EYES: EOMI,  PERRL     HENT: ear canals and TM's normal and nose and mouth without ulcers or lesions     NECK: no adenopathy, no asymmetry, masses, or scars and thyroid normal to palpation     RESP: lungs clear to auscultation - no rales, rhonchi or wheezes     CV: regular rates and rhythm, normal S1 S2, no S3 or S4 and no murmur, click or rub     ABDOMEN: soft, nontender, without hepatosplenomegaly  and umbilical hernia noted     MS: extremities normal- no gross deformities noted, no evidence of inflammation in joints, FROM in all extremities.     SKIN: no suspicious lesions or rashes     NEURO: Normal strength and tone, sensory exam grossly normal, mentation intact and speech normal     PSYCH: mentation appears normal. and affect normal/bright     LYMPHATICS: No cervical adenopathy    Recent Labs   Lab Test 07/19/23  0927 01/10/23  0809   HGB  --  15.6   PLT  --  213   NA  --  136   POTASSIUM 4.1 3.6   CR  --  0.97        Diagnostics:  Recent Results (from the past 168 hour(s))   Glucose    Collection Time: 07/19/23  9:27 AM   Result Value Ref Range    Glucose 104 (H) 70 - 99 mg/dL    Patient Fasting > 8hrs? Yes    Lipid panel reflex to direct LDL Fasting    Collection Time: 07/19/23  9:27 AM   Result Value Ref Range    Cholesterol 200 (H) <200 mg/dL    Triglycerides 163 (H) <150 mg/dL    Direct Measure HDL 39 (L) >=40 mg/dL    LDL Cholesterol Calculated 128 (H) <=100 mg/dL    Non HDL Cholesterol 161 (H) <130 mg/dL   AST    Collection Time: 07/19/23  9:27 AM   Result Value Ref Range    AST 31 0 - 45 U/L   ALT    Collection Time: 07/19/23  9:27 AM   Result Value Ref Range    ALT 29 0 - 70 U/L   Potassium    Collection Time: 07/19/23  9:27 AM   Result Value Ref Range    Potassium 4.1 3.4 - 5.3 mmol/L     We will be repeating a basic panel early in August.  Results should be available prior to the procedure  No EKG required, no history of coronary heart disease,  significant arrhythmia, peripheral arterial disease or other structural heart disease.    Revised Cardiac Risk Index (RCRI):  The patient has the following serious cardiovascular risks for perioperative complications:   - No serious cardiac risks = 0 points     RCRI Interpretation: 0 points: Class I (very low risk - 0.4% complication rate)         Signed Electronically by: Latricia Jewell PA-C  Copy of this evaluation report is provided to requesting physician.

## 2023-08-08 ENCOUNTER — LAB (OUTPATIENT)
Dept: LAB | Facility: CLINIC | Age: 53
End: 2023-08-08
Payer: COMMERCIAL

## 2023-08-08 DIAGNOSIS — I10 ESSENTIAL HYPERTENSION: ICD-10-CM

## 2023-08-08 LAB
ANION GAP SERPL CALCULATED.3IONS-SCNC: 12 MMOL/L (ref 7–15)
BUN SERPL-MCNC: 16.2 MG/DL (ref 6–20)
CALCIUM SERPL-MCNC: 9.6 MG/DL (ref 8.6–10)
CHLORIDE SERPL-SCNC: 102 MMOL/L (ref 98–107)
CREAT SERPL-MCNC: 1.04 MG/DL (ref 0.67–1.17)
DEPRECATED HCO3 PLAS-SCNC: 27 MMOL/L (ref 22–29)
GFR SERPL CREATININE-BSD FRML MDRD: 86 ML/MIN/1.73M2
GLUCOSE SERPL-MCNC: 100 MG/DL (ref 70–99)
POTASSIUM SERPL-SCNC: 4.2 MMOL/L (ref 3.4–5.3)
SODIUM SERPL-SCNC: 141 MMOL/L (ref 136–145)

## 2023-08-08 PROCEDURE — 80048 BASIC METABOLIC PNL TOTAL CA: CPT

## 2023-08-08 PROCEDURE — 36415 COLL VENOUS BLD VENIPUNCTURE: CPT

## 2023-08-18 ENCOUNTER — ANESTHESIA EVENT (OUTPATIENT)
Dept: SURGERY | Facility: CLINIC | Age: 53
End: 2023-08-18
Payer: COMMERCIAL

## 2023-08-18 ENCOUNTER — HOSPITAL ENCOUNTER (OUTPATIENT)
Facility: CLINIC | Age: 53
Discharge: HOME | End: 2023-08-18
Attending: SURGERY | Admitting: SURGERY
Payer: COMMERCIAL

## 2023-08-18 ENCOUNTER — ANESTHESIA (OUTPATIENT)
Dept: SURGERY | Facility: CLINIC | Age: 53
End: 2023-08-18
Payer: COMMERCIAL

## 2023-08-18 VITALS
TEMPERATURE: 98.8 F | HEART RATE: 74 BPM | WEIGHT: 253 LBS | SYSTOLIC BLOOD PRESSURE: 124 MMHG | RESPIRATION RATE: 13 BRPM | DIASTOLIC BLOOD PRESSURE: 81 MMHG | HEIGHT: 67 IN | BODY MASS INDEX: 39.71 KG/M2 | OXYGEN SATURATION: 93 %

## 2023-08-18 DIAGNOSIS — Z87.19 S/P LAPAROSCOPIC HERNIA REPAIR: Primary | ICD-10-CM

## 2023-08-18 DIAGNOSIS — Z98.890 S/P LAPAROSCOPIC HERNIA REPAIR: Primary | ICD-10-CM

## 2023-08-18 PROCEDURE — 250N000009 HC RX 250: Performed by: SURGERY

## 2023-08-18 PROCEDURE — 370N000017 HC ANESTHESIA TECHNICAL FEE, PER MIN: Performed by: SURGERY

## 2023-08-18 PROCEDURE — 258N000003 HC RX IP 258 OP 636: Performed by: NURSE ANESTHETIST, CERTIFIED REGISTERED

## 2023-08-18 PROCEDURE — 250N000011 HC RX IP 250 OP 636: Performed by: SURGERY

## 2023-08-18 PROCEDURE — 360N000080 HC SURGERY LEVEL 7, PER MIN: Performed by: SURGERY

## 2023-08-18 PROCEDURE — 710N000010 HC RECOVERY PHASE 1, LEVEL 2, PER MIN: Performed by: SURGERY

## 2023-08-18 PROCEDURE — 250N000011 HC RX IP 250 OP 636: Performed by: NURSE ANESTHETIST, CERTIFIED REGISTERED

## 2023-08-18 PROCEDURE — 999N000141 HC STATISTIC PRE-PROCEDURE NURSING ASSESSMENT: Performed by: SURGERY

## 2023-08-18 PROCEDURE — 250N000026 HC DESFLURANE, PER MIN: Performed by: SURGERY

## 2023-08-18 PROCEDURE — 272N000001 HC OR GENERAL SUPPLY STERILE: Performed by: SURGERY

## 2023-08-18 PROCEDURE — 710N000012 HC RECOVERY PHASE 2, PER MINUTE: Performed by: SURGERY

## 2023-08-18 PROCEDURE — 250N000009 HC RX 250: Performed by: NURSE ANESTHETIST, CERTIFIED REGISTERED

## 2023-08-18 PROCEDURE — 250N000013 HC RX MED GY IP 250 OP 250 PS 637: Performed by: SURGERY

## 2023-08-18 PROCEDURE — C1781 MESH (IMPLANTABLE): HCPCS | Performed by: SURGERY

## 2023-08-18 DEVICE — MESH VENTRALIGHT ST W/ECHO POS SYSTEM 6" CIRCLE 5955600: Type: IMPLANTABLE DEVICE | Site: ABDOMEN | Status: FUNCTIONAL

## 2023-08-18 RX ORDER — ONDANSETRON 4 MG/1
4 TABLET, ORALLY DISINTEGRATING ORAL EVERY 30 MIN PRN
Status: DISCONTINUED | OUTPATIENT
Start: 2023-08-18 | End: 2023-08-18 | Stop reason: HOSPADM

## 2023-08-18 RX ORDER — LIDOCAINE 40 MG/G
CREAM TOPICAL
Status: DISCONTINUED | OUTPATIENT
Start: 2023-08-18 | End: 2023-08-18 | Stop reason: HOSPADM

## 2023-08-18 RX ORDER — HYDROMORPHONE HYDROCHLORIDE 1 MG/ML
0.2 INJECTION, SOLUTION INTRAMUSCULAR; INTRAVENOUS; SUBCUTANEOUS EVERY 5 MIN PRN
Status: DISCONTINUED | OUTPATIENT
Start: 2023-08-18 | End: 2023-08-18 | Stop reason: HOSPADM

## 2023-08-18 RX ORDER — ONDANSETRON 2 MG/ML
INJECTION INTRAMUSCULAR; INTRAVENOUS PRN
Status: DISCONTINUED | OUTPATIENT
Start: 2023-08-18 | End: 2023-08-18

## 2023-08-18 RX ORDER — HYDROMORPHONE HYDROCHLORIDE 1 MG/ML
0.4 INJECTION, SOLUTION INTRAMUSCULAR; INTRAVENOUS; SUBCUTANEOUS EVERY 5 MIN PRN
Status: DISCONTINUED | OUTPATIENT
Start: 2023-08-18 | End: 2023-08-18 | Stop reason: HOSPADM

## 2023-08-18 RX ORDER — FENTANYL CITRATE 50 UG/ML
INJECTION, SOLUTION INTRAMUSCULAR; INTRAVENOUS PRN
Status: DISCONTINUED | OUTPATIENT
Start: 2023-08-18 | End: 2023-08-18

## 2023-08-18 RX ORDER — FENTANYL CITRATE 50 UG/ML
25 INJECTION, SOLUTION INTRAMUSCULAR; INTRAVENOUS EVERY 5 MIN PRN
Status: DISCONTINUED | OUTPATIENT
Start: 2023-08-18 | End: 2023-08-18 | Stop reason: HOSPADM

## 2023-08-18 RX ORDER — OXYCODONE AND ACETAMINOPHEN 5; 325 MG/1; MG/1
1-2 TABLET ORAL EVERY 4 HOURS PRN
Qty: 16 TABLET | Refills: 0 | Status: SHIPPED | OUTPATIENT
Start: 2023-08-18 | End: 2023-11-16

## 2023-08-18 RX ORDER — FENTANYL CITRATE 50 UG/ML
50 INJECTION, SOLUTION INTRAMUSCULAR; INTRAVENOUS EVERY 5 MIN PRN
Status: DISCONTINUED | OUTPATIENT
Start: 2023-08-18 | End: 2023-08-18 | Stop reason: HOSPADM

## 2023-08-18 RX ORDER — BUPIVACAINE HYDROCHLORIDE AND EPINEPHRINE 2.5; 5 MG/ML; UG/ML
INJECTION, SOLUTION EPIDURAL; INFILTRATION; INTRACAUDAL; PERINEURAL PRN
Status: DISCONTINUED | OUTPATIENT
Start: 2023-08-18 | End: 2023-08-18 | Stop reason: HOSPADM

## 2023-08-18 RX ORDER — SODIUM CHLORIDE, SODIUM LACTATE, POTASSIUM CHLORIDE, CALCIUM CHLORIDE 600; 310; 30; 20 MG/100ML; MG/100ML; MG/100ML; MG/100ML
INJECTION, SOLUTION INTRAVENOUS CONTINUOUS
Status: DISCONTINUED | OUTPATIENT
Start: 2023-08-18 | End: 2023-08-18 | Stop reason: HOSPADM

## 2023-08-18 RX ORDER — CEFAZOLIN SODIUM/WATER 2 G/20 ML
2 SYRINGE (ML) INTRAVENOUS SEE ADMIN INSTRUCTIONS
Status: DISCONTINUED | OUTPATIENT
Start: 2023-08-18 | End: 2023-08-18 | Stop reason: HOSPADM

## 2023-08-18 RX ORDER — OXYCODONE AND ACETAMINOPHEN 5; 325 MG/1; MG/1
2 TABLET ORAL
Status: COMPLETED | OUTPATIENT
Start: 2023-08-18 | End: 2023-08-18

## 2023-08-18 RX ORDER — PROPOFOL 10 MG/ML
INJECTION, EMULSION INTRAVENOUS PRN
Status: DISCONTINUED | OUTPATIENT
Start: 2023-08-18 | End: 2023-08-18

## 2023-08-18 RX ORDER — CEFAZOLIN SODIUM/WATER 2 G/20 ML
2 SYRINGE (ML) INTRAVENOUS
Status: COMPLETED | OUTPATIENT
Start: 2023-08-18 | End: 2023-08-18

## 2023-08-18 RX ORDER — ONDANSETRON 2 MG/ML
4 INJECTION INTRAMUSCULAR; INTRAVENOUS EVERY 30 MIN PRN
Status: DISCONTINUED | OUTPATIENT
Start: 2023-08-18 | End: 2023-08-18 | Stop reason: HOSPADM

## 2023-08-18 RX ORDER — PROPOFOL 10 MG/ML
INJECTION, EMULSION INTRAVENOUS CONTINUOUS PRN
Status: DISCONTINUED | OUTPATIENT
Start: 2023-08-18 | End: 2023-08-18

## 2023-08-18 RX ORDER — LIDOCAINE HYDROCHLORIDE 20 MG/ML
INJECTION, SOLUTION INFILTRATION; PERINEURAL PRN
Status: DISCONTINUED | OUTPATIENT
Start: 2023-08-18 | End: 2023-08-18

## 2023-08-18 RX ORDER — DIMENHYDRINATE 50 MG/ML
INJECTION, SOLUTION INTRAMUSCULAR; INTRAVENOUS PRN
Status: DISCONTINUED | OUTPATIENT
Start: 2023-08-18 | End: 2023-08-18

## 2023-08-18 RX ADMIN — FENTANYL CITRATE 50 MCG: 50 INJECTION, SOLUTION INTRAMUSCULAR; INTRAVENOUS at 17:29

## 2023-08-18 RX ADMIN — PROPOFOL 200 MG: 10 INJECTION, EMULSION INTRAVENOUS at 15:13

## 2023-08-18 RX ADMIN — MIDAZOLAM 2 MG: 1 INJECTION INTRAMUSCULAR; INTRAVENOUS at 15:04

## 2023-08-18 RX ADMIN — ONDANSETRON 4 MG: 2 INJECTION INTRAMUSCULAR; INTRAVENOUS at 16:21

## 2023-08-18 RX ADMIN — ROCURONIUM BROMIDE 50 MG: 50 INJECTION, SOLUTION INTRAVENOUS at 15:13

## 2023-08-18 RX ADMIN — HYDROMORPHONE HYDROCHLORIDE 0.4 MG: 1 INJECTION, SOLUTION INTRAMUSCULAR; INTRAVENOUS; SUBCUTANEOUS at 17:38

## 2023-08-18 RX ADMIN — Medication 2 G: at 15:04

## 2023-08-18 RX ADMIN — LIDOCAINE HYDROCHLORIDE 50 MG: 20 INJECTION, SOLUTION INFILTRATION; PERINEURAL at 15:12

## 2023-08-18 RX ADMIN — SODIUM CHLORIDE, POTASSIUM CHLORIDE, SODIUM LACTATE AND CALCIUM CHLORIDE 10 ML/HR: 600; 310; 30; 20 INJECTION, SOLUTION INTRAVENOUS at 14:01

## 2023-08-18 RX ADMIN — HYDROMORPHONE HYDROCHLORIDE 0.5 MG: 1 INJECTION, SOLUTION INTRAMUSCULAR; INTRAVENOUS; SUBCUTANEOUS at 15:30

## 2023-08-18 RX ADMIN — FENTANYL CITRATE 50 MCG: 50 INJECTION, SOLUTION INTRAMUSCULAR; INTRAVENOUS at 17:21

## 2023-08-18 RX ADMIN — DIMENHYDRINATE 25 MG: 50 INJECTION, SOLUTION INTRAMUSCULAR; INTRAVENOUS at 15:17

## 2023-08-18 RX ADMIN — OXYCODONE HYDROCHLORIDE AND ACETAMINOPHEN 2 TABLET: 5; 325 TABLET ORAL at 18:32

## 2023-08-18 RX ADMIN — PROPOFOL 100 MCG/KG/MIN: 10 INJECTION, EMULSION INTRAVENOUS at 15:17

## 2023-08-18 RX ADMIN — SUGAMMADEX 200 MG: 100 INJECTION, SOLUTION INTRAVENOUS at 16:48

## 2023-08-18 RX ADMIN — SODIUM CHLORIDE, POTASSIUM CHLORIDE, SODIUM LACTATE AND CALCIUM CHLORIDE: 600; 310; 30; 20 INJECTION, SOLUTION INTRAVENOUS at 16:47

## 2023-08-18 RX ADMIN — FENTANYL CITRATE 75 MCG: 50 INJECTION, SOLUTION INTRAMUSCULAR; INTRAVENOUS at 15:04

## 2023-08-18 ASSESSMENT — ACTIVITIES OF DAILY LIVING (ADL)
ADLS_ACUITY_SCORE: 33
ADLS_ACUITY_SCORE: 35

## 2023-08-18 ASSESSMENT — LIFESTYLE VARIABLES: TOBACCO_USE: 1

## 2023-08-18 NOTE — ANESTHESIA PREPROCEDURE EVALUATION
Anesthesia Pre-Procedure Evaluation    Patient: Ruperto Mcbride   MRN: [id number] : 1970        Procedure : Procedure(s):  Robot-assisted laparoscopic recurrent incarcerated umbilical hernia repair with mesh          Past Medical History:   Diagnosis Date     Hypertension 2023      Past Surgical History:   Procedure Laterality Date     ABDOMEN SURGERY  2020     COLONOSCOPY       HERNIA REPAIR  2020    Umbilical Hernia Repair     KNEE SURGERY      2013     TONSILLECTOMY       UMBILICAL HERNIA REPAIR            Allergies   Allergen Reactions     Bee Venom Swelling     Other reaction(s): Swelling        Social History     Tobacco Use     Smoking status: Former     Types: Dip, chew, snus or snuff     Passive exposure: Never     Smokeless tobacco: Former     Quit date: 2019   Substance Use Topics     Alcohol use: Not Currently     Comment: No alcohol in 75 days      Wt Readings from Last 1 Encounters:   23 114.9 kg (253 lb 3.2 oz)        Anesthesia Evaluation   Pt has had prior anesthetic. Type: General and MAC.    No history of anesthetic complications       ROS/MED HX  ENT/Pulmonary:     (+) sleep apnea (uses oral appliance), doesn't use CPAP,              tobacco use, Past use,                      Neurologic:  - neg neurologic ROS     Cardiovascular:     (+) Dyslipidemia hypertension- -   -  - -                                 No previous cardiac testing     METS/Exercise Tolerance:     Hematologic:  - neg hematologic  ROS     Musculoskeletal: Comment: Hx left knee pain    Umbilical Hernia      GI/Hepatic:  - neg GI/hepatic ROS  (-) GERD   Renal/Genitourinary:  - neg Renal ROS     Endo:     (+)               Obesity,       Psychiatric/Substance Use:  - neg psychiatric ROS     Infectious Disease:  - neg infectious disease ROS     Malignancy:  - neg malignancy ROS     Other:  - neg other ROS          Physical Exam    Airway        Mallampati: II   TM distance: > 3 FB   Neck  ROM: full   Mouth opening: > 3 cm    Respiratory Devices and Support         Dental       (+) Minor Abnormalities - some fillings, tiny chips      Cardiovascular   cardiovascular exam normal       Rhythm and rate: regular and normal     Pulmonary   pulmonary exam normal        breath sounds clear to auscultation       OUTSIDE LABS:  CBC:   Lab Results   Component Value Date    WBC 9.9 01/10/2023    HGB 15.6 01/10/2023    HCT 46.8 01/10/2023     01/10/2023     BMP:   Lab Results   Component Value Date     08/08/2023     01/10/2023    POTASSIUM 4.2 08/08/2023    POTASSIUM 4.1 07/19/2023    CHLORIDE 102 08/08/2023    CHLORIDE 96 01/10/2023    CO2 27 08/08/2023    CO2 33 (H) 01/10/2023    BUN 16.2 08/08/2023    BUN 20 01/10/2023    CR 1.04 08/08/2023    CR 0.97 01/10/2023     (H) 08/08/2023     (H) 07/19/2023     COAGS: No results found for: PTT, INR, FIBR  POC: No results found for: BGM, HCG, HCGS  HEPATIC:   Lab Results   Component Value Date    ALBUMIN 3.1 (L) 01/10/2023    PROTTOTAL 7.7 01/10/2023    ALT 29 07/19/2023    AST 31 07/19/2023    ALKPHOS 55 01/10/2023    BILITOTAL 0.6 01/10/2023     OTHER:   Lab Results   Component Value Date    CARLYN 9.6 08/08/2023       Anesthesia Plan    ASA Status:  2    NPO Status:  NPO Appropriate    Anesthesia Type: General.     - Airway: ETT   Induction: Intravenous, Propofol.   Maintenance: Balanced.        Consents    Anesthesia Plan(s) and associated risks, benefits, and realistic alternatives discussed. Questions answered and patient/representative(s) expressed understanding.     - Discussed:     - Discussed with:  Patient      - Extended Intubation/Ventilatory Support Discussed: No.      - Patient is DNR/DNI Status: No     Use of blood products discussed: Yes.     - Discussed with: Patient.     - Consented: consented to blood products            Reason for refusal: other.     Postoperative Care    Pain management: IV analgesics, Oral pain  medications, Multi-modal analgesia.     - Plan for long acting post-op opioid use   PONV prophylaxis: Ondansetron (or other 5HT-3), Meclizine or Dimenhydrinate, Background Propofol Infusion     Comments:    Other Comments: The risks and benefits of anesthesia, and the alternatives where applicable, have been discussed with the patient, and they wish to proceed.               GHULAM Alvarez CRNA

## 2023-08-18 NOTE — ANESTHESIA PROCEDURE NOTES
Airway       Patient location during procedure: OR       Procedure Start/Stop Times: 8/18/2023 3:15 PM  Staff -        CRNA: Bacilio Cai APRN CRNA       Performed By: CRNA  Consent for Airway        Urgency: elective  Indications and Patient Condition       Indications for airway management: kai-procedural       Induction type:intravenous       Mask difficulty assessment: 1 - vent by mask    Final Airway Details       Final airway type: endotracheal airway       Successful airway: ETT - single  Endotracheal Airway Details        ETT size (mm): 7.5       Cuffed: yes       Successful intubation technique: video laryngoscopy       VL Blade Size: Gonzalez 3       Grade View of Cords: 1       Adjucts: stylet       Position: Right       Measured from: lips       Secured at (cm): 23       Bite block used: Oral Airway    Post intubation assessment        Placement verified by: capnometry, equal breath sounds and chest rise        Number of attempts at approach: 1       Number of other approaches attempted: 0       Secured with: plastic tape       Ease of procedure: easy       Dentition: Intact and Unchanged    Medication(s) Administered   Medication Administration Time: 8/18/2023 3:15 PM

## 2023-08-18 NOTE — ANESTHESIA CARE TRANSFER NOTE
Patient: Ruperto Mcbride    Procedure: Procedure(s):  Robot-assisted laparoscopic recurrent incarcerated umbilical hernia repair with mesh       Diagnosis: Recurrent umbilical hernia [K42.9]  Diagnosis Additional Information: No value filed.    Anesthesia Type:   General     Note:    Oropharynx: oropharynx clear of all foreign objects and spontaneously breathing  Level of Consciousness: drowsy  Oxygen Supplementation: face mask    Independent Airway: airway patency satisfactory and stable  Dentition: dentition unchanged  Vital Signs Stable: post-procedure vital signs reviewed and stable  Report to RN Given: handoff report given  Patient transferred to: PACU    Handoff Report: Identifed the Patient, Identified the Reponsible Provider, Reviewed the pertinent medical history, Discussed the surgical course, Reviewed Intra-OP anesthesia mangement and issues during anesthesia, Set expectations for post-procedure period and Allowed opportunity for questions and acknowledgement of understanding      Vitals:  Vitals Value Taken Time   /95 08/18/23 1715   Temp 98.6  F (37  C) 08/18/23 1718   Pulse 81 08/18/23 1718   Resp 12 08/18/23 1718   SpO2 93 % 08/18/23 1718   Vitals shown include unvalidated device data.    Electronically Signed By: GHULAM Alvarez CRNA  August 18, 2023  5:20 PM

## 2023-08-18 NOTE — INTERVAL H&P NOTE
"I have reviewed the surgical (or preoperative) H&P that is linked to this encounter, and examined the patient. There are no significant changes    Clinical Conditions Present on Arrival:  Clinically Significant Risk Factors Present on Admission                  # Obesity: Estimated body mass index is 39.94 kg/m  as calculated from the following:    Height as of this encounter: 1.695 m (5' 6.73\").    Weight as of this encounter: 114.8 kg (253 lb).       "

## 2023-08-18 NOTE — DISCHARGE INSTRUCTIONS
[facility]    Home Care Following Hernia Repair    Dr. Brito    Hernia Type:  Umbilical robot assisted with mesh    Care of the Incision:  Surgical glue was used, keep your incision dry for 24 hours.  Then you may shower, but don t submerge under water for at least 2 weeks.  Gently pat your incision dry with a freshly laundered towel.  Do not touch your incision with bare hands or pick at scabs.  Leave your incision open to air.  Cover it only if clothing rubs or irritates it.  Activity:  Gradually increase your activity.  Walk short distances several times each day and increase the distance as your strength allows.  To promote circulation, do not cross your legs while sitting.  No strenuous lifting or straining for 2-3 weeks.   Do not lift anything over 10-20 pounds until your doctor approves an increase.  Return to work will be determined by the type of work you do and should be discussed with your physician.  Do not drive or operate equipment while taking prescription pain medicines.  You may drive 1 week after surgery if you have stopped taking prescription pain medicines and are pain-free enough to react quickly and make an emergency stop if necessary.    Diet:  Return to the diet you were on before surgery.  Drink plenty of  water.  Avoid foods that cause constipation.    REMEMBER--most prescription pain pills cause constipation.  Walking, extra fluids, and increased fiber (fresh fruits and vegetables, etc.) are natural remedies for constipation.  You can also take mineral oil, 1-2 Tablespoons per day.  If still constipated you may try a stool softener such as Colace or Miralax.    Call Your Physician if You Have:  Redness, increased swelling or cloudy drainage from your incision.  A temperature of more than 101 degrees F.  Worsening pain in your incision not relieved by your prescription pain pills and/or a short rest.  Any questions or concerns about your recovery, please call      Business hours [phone]    After [id number]() [phone] Nurse Advice Line (24 hours a day)    Follow-up Care:  If appointment not already made, make an appointment 2-3 weeks after your surgery.  Call [phone]    [facility] Same-Day [facility]   Adult Discharge Orders & Instructions     For 24 hours after surgery    Get plenty of rest.  A responsible adult must stay with you for at least 24 hours after you leave the hospital.   Do not drive or use heavy equipment.  If you have weakness or tingling, don't drive or use heavy equipment until this feeling goes away.  Do not drink alcohol.  Avoid strenuous or risky activities.  Ask for help when climbing stairs.   You may feel lightheaded.  If so, sit for a few minutes before standing.  Have someone help you get up.   You may have a slight fever. Call the doctor if your fever is over 100 F (37.7 C) (taken under the tongue) or lasts longer than 24 hours.  You may have a dry mouth, a sore throat, muscle aches or trouble sleeping.  These should go away after 24 hours.  Do not make important or legal decisions.  We don t expect you to have any problems from the surgery or treatment you had today. Just in case, here s what to do if you have pain, upset stomach (nausea), bleeding or infection:  Pain:  Take medicines your doctor has prescribed or over-the-counter medicine they have suggested. Resting and using ice packs can help, too. For surgery on an arm or leg, raise it on a pillow to ease swelling. Call your doctor if these methods don t work.  Copyright [employer]rinivas[employer], Licensed under [employer]  Upset stomach (nausea):  Take anti-nausea medicine approved by your doctor. Drink clear liquids like apple juice, ginger ale, broth or 7-Up. Be sure to drink enough fluids. Rest can help, too. Move to normal foods when you re ready.    Copyright [employer], Licensed under [employer]  Fever/Infection: Please call your doctor if you have  any of these signs:  Redness  Swelling  Wound feels warm  Pain gets worse  Bad-smelling fluid leaks from wound  Fever or chills  Call your doctor for any of the followin.  It has been over 8 to 10 hours since surgery and you are still not able to urinate (pass water).    2.  Headache for over 24 hours.        Nurse advice line: [phone]

## 2023-08-18 NOTE — OP NOTE
Procedure Date: 8/18/2023     PROCEDURE:            Robot-assisted laparoscopic incarcerated recurrent umbilical hernia repair with mesh                                         PREOPERATIVE DIAGNOSIS: Recurrent incarcerated umbilical hernia.     POSTOPERATIVE DIAGNOSIS: Recurrent incarcerated Umbilical hernia     SURGEON:  Aravind Brito DO     ASSISTANT: Annamaria Gilliam PA-C; assistance was necessary for port placement, tissue handling, mesh positioning and placement, instrument exchange, fascial closure and incision closure     ANESTHESIA:  General endotracheal anesthesia.     SPECIMENS:  None     ESTIMATED BLOOD LOSS:  10 mL.      COMPLICATIONS:  None immediately apparent.     OPERATIVE FINDINGS:  A 5 cm recurrent umbilical hernia defect with pre-peritoneal fat incarceration     INDICATIONS FOR PROCEDURE: The patient is a 52year old male whom I met in the surgical clinic with complaint of painful periumbilical bulge.  He had this fixed emergently in the past without mesh.  Physical exam corroborated the history and robot-assisted laparoscopic recurrent incarcerated umbilical hernia repair with mesh was recommended.  We discussed the procedure in detail and after the discussion, agreed to proceed with surgery.     DESCRIPTION OF PROCEDURE:  After the informed consent was obtained, the patient was brought from the preoperative holding area to the operating room and placed in the supine position.  Anesthesia was induced. They were prepped and draped in the normal sterile fashion.  Timeout was performed.  After the correct patient and correct procedure were verified, we began by making an 8 mm incision in the left upper quadrant just underneath the left subcostal margin.  A Veress needle was inserted into the peritoneal cavity and the abdomen was insufflated to 15 mmHg.  Trocar was inserted.  Camera was inserted.  General survey of the abdomen revealed a Swiss cheese 5 cm umbilical hernia defect.  A robotic camera  trocar was placed in the left mid abdomen and an additional robot trocar placed in left lower quadrant both under direct visualization.  The patient was placed in slight right side down position.  We chose a 6 inch Echo Ventralight ST mesh for the repair.  This was placed in the peritoneal cavity through the left lower quadrant incision.  Two 2-0 Stratafix sutures and an 0 Stratafix suture were placed in the peritoneal cavity as well.  The robot was docked.  We used a Cadiere grasper in the left arm and a monopolar scissors in the right arm.  We began by incising the peritoneum superiorly several centimeters above the hernia defect.  The peritoneum and preperitoneal fat were dissected away from the posterior sheath all the way up to and through the hernia.  Hernia sac was reduced in the peritoneal cavity with the preperitoneal fat hernia contents.  Once an appropriate space had been cleared for the mesh, we used a needle  to close the hernia defect using the 0 Stratafix suture in running fashion.  Then, a small nick incision was made supraumbilically and a PMI grasper was used to grasp the catheter at the center of the mesh and pulled through the abdominal wall.  The scaffolding was insufflated and clamped.  This mesh was secured in place using 2-0 Stratafix suture in a running fashion circumferentially.  The catheter was then cut externally and the scaffolding was removed from the mesh.  The mesh appeared to be in good position.  The scaffolding was removed from the peritoneal cavity as well as all 3 needles and sutures intact without issue.  I also remove the hernia sac and preperitoneal fat as there were several holes in this in an Endo Catch bag.  Another survey of the abdomen revealed no operative complications and no ongoing bleeding.  The robot was then undocked.  The abdomen was then desufflated while the ports were removed under direct visualization.  The skin was instilled with 0.25% Marcaine with  epinephrine for local anesthesia.  Skin was closed with inverted interrupted 4-0 Monocryl sutures and topical adhesive dressing was applied.  At the completion of the case all instruments, needles, and sponges were accounted for after a correct count.  The patient was then awoken from anesthesia and brought to recovery room in stable condition.     Aravind Brito DO, FACS

## 2023-08-19 NOTE — ANESTHESIA POSTPROCEDURE EVALUATION
Patient: Ruperto Mcbride    Procedure: Procedure(s):  Robot-assisted laparoscopic recurrent incarcerated umbilical hernia repair with mesh       Anesthesia Type:  General    Note:  Disposition: Outpatient   Postop Pain Control: Uneventful            Sign Out: Well controlled pain   PONV: No   Neuro/Psych: Uneventful            Sign Out: Acceptable/Baseline neuro status   Airway/Respiratory: Uneventful            Sign Out: Acceptable/Baseline resp. status   CV/Hemodynamics: Uneventful            Sign Out: Acceptable CV status   Other NRE: NONE   DID A NON-ROUTINE EVENT OCCUR? No    Event details/Postop Comments:  Pt was happy with anesthesia care.  He took a little longer than average to maintain his SATS, but these were consistently greater than 90 on RA prior to discharge.  I will follow up with the pt if needed.           Last vitals:  Vitals Value Taken Time   /85 08/18/23 1755   Temp 98.96  F (37.2  C) 08/18/23 1756   Pulse 78 08/18/23 1758   Resp 20 08/18/23 1758   SpO2 95 % 08/18/23 1759   Vitals shown include unvalidated device data.    Electronically Signed By: GHULAM Alvarez CRNA  August 18, 2023  7:30 PM

## 2023-08-31 ENCOUNTER — OFFICE VISIT (OUTPATIENT)
Dept: SURGERY | Facility: OTHER | Age: 53
End: 2023-08-31
Payer: COMMERCIAL

## 2023-08-31 VITALS
SYSTOLIC BLOOD PRESSURE: 118 MMHG | DIASTOLIC BLOOD PRESSURE: 64 MMHG | TEMPERATURE: 98.6 F | WEIGHT: 253 LBS | HEIGHT: 67 IN | BODY MASS INDEX: 39.71 KG/M2

## 2023-08-31 DIAGNOSIS — Z87.19 S/P LAPAROSCOPIC HERNIA REPAIR: Primary | ICD-10-CM

## 2023-08-31 DIAGNOSIS — Z98.890 S/P LAPAROSCOPIC HERNIA REPAIR: Primary | ICD-10-CM

## 2023-08-31 PROCEDURE — 99213 OFFICE O/P EST LOW 20 MIN: CPT | Performed by: SURGERY

## 2023-08-31 ASSESSMENT — PAIN SCALES - GENERAL: PAINLEVEL: NO PAIN (0)

## 2023-08-31 NOTE — PROGRESS NOTES
General Surgery Follow Up    Pt returns for follow up visit s/p robot ventral hernia repair with mesh    HPI:  Doing well.  Minimal discomfort.  No issues with bowels or bladder.  Did not need all of the pain medication.      Past Medical History:   Diagnosis Date    Hypertension 12/2023       Past Surgical History:   Procedure Laterality Date    ABDOMEN SURGERY  12/19/2020    COLONOSCOPY  2021    HERNIA REPAIR  12/19/2020    Umbilical Hernia Repair    HERNIORRHAPHY, UMBILICAL, ROBOT-ASSISTED, LAPAROSCOPIC, USING DA DEIRDRE XI N/A 8/18/2023    Procedure: Robot-assisted laparoscopic recurrent incarcerated umbilical hernia repair with mesh;  Surgeon: Aravind Brito DO;  Location: [facility] OR    KNEE SURGERY      2013    TONSILLECTOMY      UMBILICAL HERNIA REPAIR      2020       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   Tobacco Use    Smoking status: Former     Types: Dip, chew, snus or snuff     Passive exposure: Never    Smokeless tobacco: Former     Quit date: 1/1/2019   Vaping Use    Vaping Use: Never used   Substance and Sexual Activity    Alcohol use: Not Currently     Comment: No alcohol in 75 days    Drug use: Not on file    Sexual activity: Not Currently     Partners: Female     Birth control/protection: Post-menopausal   Other Topics Concern    Not on file   Social History Narrative    Not on file     Social Determinants of Health     Financial Resource Strain: Not on file   Food Insecurity: Not on file   Transportation Needs: Not on file   Physical Activity: Not on file   Stress: Not on file   Social Connections: Not on file   Intimate Partner Violence: Not on file   Housing Stability: Not on file       Current Outpatient Medications   Medication Sig Dispense Refill     "chlorthalidone (HYGROTON) 25 MG tablet Take 1 tablet (25 mg) by mouth daily Take 25 mg by mouth daily 90 tablet 1    lisinopril (ZESTRIL) 10 MG tablet Take 1 tablet (10 mg) by mouth daily 90 tablet 3    oxyCODONE-acetaminophen (PERCOCET) 5-325 MG tablet Take 1-2 tablets by mouth every 4 hours as needed for pain (Patient not taking: Reported on 8/31/2023) 16 tablet 0       Medications and history reviewed    Physical exam:  Vitals: /64   Temp 98.6  F (37  C) (Temporal)   Ht 1.695 m (5' 6.73\")   Wt 114.8 kg (253 lb)   BMI 39.95 kg/m    BMI= Body mass index is 39.95 kg/m .    HEART: RRR, no new murmurs  LUNGS: CTAB, equal chest rise, good effort  ABD: soft, non tender, non distended  INCISIONS: Plain dry intact  EXT: HARMON, no deformities    PATHOLOGY:  None    Assessment:     ICD-10-CM    1. S/P laparoscopic hernia repair  Z98.890     Z87.19         Plan: Doing well.  Restrictions reviewed and questions answered.  Follow-up as needed.  Patient does have a sebaceous cyst or lipoma in the upper mid back he would like excised at some point.  He will call to schedule excision in the future..    20 minutes spent by me on the date of the encounter doing chart review, history and exam, documentation and further activities per the note    Aravind Brito, DO    "

## 2023-08-31 NOTE — LETTER
8/31/2023         RE: Ruperto Mcbride  [id number] [facility] [address]        Dear Colleague,    Thank you for referring your patient, Ruperto Mcbride, to the [facility]. Please see a copy of my visit note below.    General Surgery Follow Up    Pt returns for follow up visit s/p robot ventral hernia repair with mesh    HPI:  Doing well.  Minimal discomfort.  No issues with bowels or bladder.  Did not need all of the pain medication.      Past Medical History:   Diagnosis Date     Hypertension 12/2023       Past Surgical History:   Procedure Laterality Date     ABDOMEN SURGERY  12/19/2020     COLONOSCOPY  2021     HERNIA REPAIR  12/19/2020    Umbilical Hernia Repair     HERNIORRHAPHY, UMBILICAL, ROBOT-ASSISTED, LAPAROSCOPIC, USING DA DEIRDRE XI N/A 8/18/2023    Procedure: Robot-assisted laparoscopic recurrent incarcerated umbilical hernia repair with mesh;  Surgeon: Aravind Brito DO;  Location: [facility] OR     KNEE SURGERY      2013     TONSILLECTOMY       UMBILICAL HERNIA REPAIR      2020       Social History     Socioeconomic History     Marital status: Single     Spouse name: Not on file     Number of children: Not on file     Years of education: Not on file     Highest education level: Not on file   Occupational History     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   Tobacco Use     Smoking status: Former     Types: Dip, chew, snus or snuff     Passive exposure: Never     Smokeless tobacco: Former     Quit date: 1/1/2019   Vaping Use     Vaping Use: Never used   Substance and Sexual Activity     Alcohol use: Not Currently     Comment: No alcohol in 75 days     Drug use: Not on file     Sexual activity: Not Currently     Partners: Female     Birth control/protection: Post-menopausal   Other Topics Concern     Not on file   Social History Narrative     Not on file     Social Determinants of Health     Financial Resource Strain: Not on file   Food Insecurity: Not on file   Transportation  "Needs: Not on file   Physical Activity: Not on file   Stress: Not on file   Social Connections: Not on file   Intimate Partner Violence: Not on file   Housing Stability: Not on file       Current Outpatient Medications   Medication Sig Dispense Refill     chlorthalidone (HYGROTON) 25 MG tablet Take 1 tablet (25 mg) by mouth daily Take 25 mg by mouth daily 90 tablet 1     lisinopril (ZESTRIL) 10 MG tablet Take 1 tablet (10 mg) by mouth daily 90 tablet 3     oxyCODONE-acetaminophen (PERCOCET) 5-325 MG tablet Take 1-2 tablets by mouth every 4 hours as needed for pain (Patient not taking: Reported on 8/31/2023) 16 tablet 0       Medications and history reviewed    Physical exam:  Vitals: /64   Temp 98.6  F (37  C) (Temporal)   Ht 1.695 m (5' 6.73\")   Wt 114.8 kg (253 lb)   BMI 39.95 kg/m    BMI= Body mass index is 39.95 kg/m .    HEART: RRR, no new murmurs  LUNGS: CTAB, equal chest rise, good effort  ABD: soft, non tender, non distended  INCISIONS: Plain dry intact  EXT: HARMON, no deformities    PATHOLOGY:  None    Assessment:     ICD-10-CM    1. S/P laparoscopic hernia repair  Z98.890     Z87.19         Plan: Doing well.  Restrictions reviewed and questions answered.  Follow-up as needed.  Patient does have a sebaceous cyst or lipoma in the upper mid back he would like excised at some point.  He will call to schedule excision in the future..    20 minutes spent by me on the date of the encounter doing chart review, history and exam, documentation and further activities per the note    Aravind Brito, DO      Again, thank you for allowing me to participate in the care of your patient.        Sincerely,        Aravind Brito, DO  "

## 2023-11-16 ENCOUNTER — VIRTUAL VISIT (OUTPATIENT)
Dept: FAMILY MEDICINE | Facility: CLINIC | Age: 53
End: 2023-11-16
Payer: COMMERCIAL

## 2023-11-16 DIAGNOSIS — J20.9 ACUTE BRONCHITIS, UNSPECIFIED ORGANISM: Primary | ICD-10-CM

## 2023-11-16 PROCEDURE — 99213 OFFICE O/P EST LOW 20 MIN: CPT | Mod: VID | Performed by: PHYSICIAN ASSISTANT

## 2023-11-16 RX ORDER — BENZONATATE 100 MG/1
100 CAPSULE ORAL 3 TIMES DAILY PRN
Qty: 20 CAPSULE | Refills: 0 | Status: SHIPPED | OUTPATIENT
Start: 2023-11-16 | End: 2024-03-12

## 2023-11-16 RX ORDER — ALBUTEROL SULFATE 90 UG/1
2 AEROSOL, METERED RESPIRATORY (INHALATION) EVERY 4 HOURS PRN
Qty: 18 G | Refills: 0 | Status: SHIPPED | OUTPATIENT
Start: 2023-11-16 | End: 2024-01-24

## 2023-11-16 NOTE — PROGRESS NOTES
"    Instructions Relayed to Patient by Virtual Roomer:     Patient is active on [employer]:   Relayed following to patient: \"It looks like you are active on [employer], are you able to join the visit this way? If not, do you need us to send you a link now or would you like your provider to send a link via text or email when they are ready to initiate the visit?\"    Reminded patient to ensure they were logged on to virtual visit by arrival time listed. Documented in appointment notes if patient had flexibility to initiate visit sooner than arrival time. If pediatric virtual visit, ensured pediatric patient along with parent/guardian will be present for video visit.     Patient offered the website [email or web address] and/or phone number to [employer] Help line: [phone]     Werner is a 53 year old who is being evaluated via a billable video visit.      How would you like to obtain your AVS? DataEmail [employer]  If the video visit is dropped, the invitation should be resent by: Send to e-mail at: [email or web address]  Will anyone else be joining your video visit? No          Assessment & Plan     Acute bronchitis, unspecified organism  Treat for viral  bronchitis  Cont mucinex   All albuterol and tessalon  Continue pushing fluids and symptomatic management   - albuterol (PROAIR HFA/PROVENTIL HFA/VENTOLIN HFA) 108 (90 Base) MCG/ACT inhaler; Inhale 2 puffs into the lungs every 4 hours as needed for shortness of breath, wheezing or cough  - benzonatate (TESSALON) 100 MG capsule; Take 1 capsule (100 mg) by mouth 3 times daily as needed for cough  }     Follow Up: see above. Additionally patient was instructed to contact clinic for worsening symptoms, non-improvement in time frame discussed, and for questions regarding treatment plan.   For virtual visits, the patient was advised to be seen for in person evaluation if symptoms or condition are worsening or non-improvement as expected.       KHADRA Oakley " "[facility] ANEL Caldera is a 53 year old, presenting for the following health issues:  Cough (Started for about a week, unable to sleep because of cough )        11/16/2023     1:59 PM   Additional Questions   Roomed by Nataliia LARA   Accompanied by Self       History of Present Illness       Reason for visit:  Cough  Symptom onset:  3-7 days ago  Symptoms include:  Cough, Coughing phlem, unable to sleep due to coughing  Symptom intensity:  Moderate  Symptom progression:  Staying the same  Had these symptoms before:  No  What makes it worse:  No  What makes it better:  Unsure    He eats 0-1 servings of fruits and vegetables daily.He consumes 1 sweetened beverage(s) daily.He exercises with enough effort to increase his heart rate 30 to 60 minutes per day.  He exercises with enough effort to increase his heart rate 5 days per week.   He is taking medications regularly.       Acute Illness  Acute illness concerns: Cough   Onset/Duration: A week -   \"Probably just a cold but feels like it is just lingering.\"  At night when sleeping - \"cough attacks\". Not sleeping well. So tired from not sleeping well.   Nose/sinus feels ok.  More in chest. When can get anything up- it is clear.   No CP.  No SOB.   Taking mucinex and vit C.   Eating and drinking normally.     Symptoms:  Fever: No, but felt hot   Chills/Sweats: YES  Headache (location?): YES- from coughing   Sinus Pressure: No  Conjunctivitis:  No  Ear Pain: no  Rhinorrhea: No  Congestion: YES  Sore Throat: No  Cough: YES-productive of yellow sputum, productive of green sputum  Wheeze: Yes   Decreased Appetite: No  Nausea: No  Vomiting: No  Diarrhea: No  Dysuria/Freq.: No  Dysuria or Hematuria: No  Fatigue/Achiness: YES- pt reports not sleeping well due to cough   Sick/Strep Exposure: No  Therapies tried and outcome: OTC Cough medication, cough drops, steam showers         Review of Systems   Constitutional, HEENT, cardiovascular, pulmonary, gi and " gu systems are negative, except as otherwise noted.      Objective           Vitals:  No vitals were obtained today due to virtual visit.    Physical Exam   GENERAL: Healthy, alert and no distress  EYES: Eyes grossly normal to inspection.  No discharge or erythema, or obvious scleral/conjunctival abnormalities.  HENT: Normal cephalic/atraumatic.  External ears, nose and mouth without ulcers or lesions.  No nasal drainage visible.  RESP: No audible wheeze, cough, or visible cyanosis.  No visible retractions or increased work of breathing.    SKIN: Visible skin clear. No significant rash, abnormal pigmentation or lesions.  NEURO: Cranial nerves grossly intact.  Mentation and speech appropriate for age.  PSYCH: Mentation appears normal, affect normal/bright, judgement and insight intact, normal speech and appearance well-groomed.            Video-Visit Details    Type of service:  Video Visit     Originating Location (pt. Location): Home    Distant Location (provider location):  Off-site  Platform used for Video Visit: C[employer]ia

## 2023-11-16 NOTE — PATIENT INSTRUCTIONS
Viral respiratory infections are caused by viruses.   They do not improve with antibiotic treatment.   Symptoms tend to be most significant in the first 4-5 days, but may continue for 7-10 days.    Should be re-evaluated for new fevers, shortness of breath or difficulty breathing, painful breathing, chest pain, inability to swallow, other new or worsening symptoms.     Symptomatic treatments include:  Rest!   Stay well hydrated (water, broth soups, 7-up, gatorade, tea, etc). You should be able to empty your bladder every 4-6 hours.   Using a humidifer in the bedroom  Nasal saline sprays (ie little noses brand- OTC at pharmacy) for nasal congestion.  Vitamin C  Stay home, limit contact with others while you are ill, do not share food or beverages, and always use good handwashing.     For symptoms can try the following:   For body aches, headache, pain:   Tylenol (acetaminophen) up to 1000mg 3x/day.     For nasal congestion:   Saline nasal spray or flush   Vicks Vapor Rub     For cough:   Vicks Vapor Rub  Mucinex tablets (guaifenesin)- loosens chest mucus : 600mg extended release twice daily  Dextromethorphan containing cough syrup such as Delsym or Robitussin DM. Use on limited basis.    Honey may soothe your sore throat and help manage your cough- may take straight or in warm water with lemon juice.    For sore throat:   Gargle with salt water   Drink fluids. Things with honey and lemon can be soothing.   Throat lozenges as needed.   Over-the-counter chloraseptic spray    Return to Clinic  if symptoms not gradually improving over the next week or if worsening.

## 2024-01-07 DIAGNOSIS — I10 ESSENTIAL HYPERTENSION: ICD-10-CM

## 2024-01-08 RX ORDER — CHLORTHALIDONE 25 MG/1
25 TABLET ORAL DAILY
Qty: 90 TABLET | Refills: 1 | Status: SHIPPED | OUTPATIENT
Start: 2024-01-08

## 2024-01-24 DIAGNOSIS — J20.9 ACUTE BRONCHITIS, UNSPECIFIED ORGANISM: ICD-10-CM

## 2024-01-24 RX ORDER — ALBUTEROL SULFATE 90 UG/1
AEROSOL, METERED RESPIRATORY (INHALATION)
Qty: 8.5 G | Refills: 0 | Status: SHIPPED | OUTPATIENT
Start: 2024-01-24 | End: 2024-03-12

## 2024-03-12 ENCOUNTER — OFFICE VISIT (OUTPATIENT)
Dept: FAMILY MEDICINE | Facility: CLINIC | Age: 54
End: 2024-03-12
Payer: COMMERCIAL

## 2024-03-12 VITALS
TEMPERATURE: 98.4 F | SYSTOLIC BLOOD PRESSURE: 128 MMHG | RESPIRATION RATE: 16 BRPM | WEIGHT: 296.7 LBS | HEART RATE: 85 BPM | DIASTOLIC BLOOD PRESSURE: 82 MMHG | HEIGHT: 67 IN | OXYGEN SATURATION: 95 % | BODY MASS INDEX: 46.57 KG/M2

## 2024-03-12 DIAGNOSIS — Z53.9 DIAGNOSIS NOT YET DEFINED: ICD-10-CM

## 2024-03-12 DIAGNOSIS — E66.01 MORBID OBESITY (H): ICD-10-CM

## 2024-03-12 DIAGNOSIS — L72.9 INFECTED CYST OF SKIN: Primary | ICD-10-CM

## 2024-03-12 DIAGNOSIS — L08.9 INFECTED CYST OF SKIN: Primary | ICD-10-CM

## 2024-03-12 PROCEDURE — 87077 CULTURE AEROBIC IDENTIFY: CPT | Mod: 59 | Performed by: PHYSICIAN ASSISTANT

## 2024-03-12 PROCEDURE — 87186 SC STD MICRODIL/AGAR DIL: CPT | Performed by: PHYSICIAN ASSISTANT

## 2024-03-12 PROCEDURE — 10060 I&D ABSCESS SIMPLE/SINGLE: CPT | Performed by: PHYSICIAN ASSISTANT

## 2024-03-12 PROCEDURE — 87070 CULTURE OTHR SPECIMN AEROBIC: CPT | Performed by: PHYSICIAN ASSISTANT

## 2024-03-12 RX ORDER — LIDOCAINE HYDROCHLORIDE AND EPINEPHRINE 10; 10 MG/ML; UG/ML
2 INJECTION, SOLUTION INFILTRATION; PERINEURAL ONCE
Status: COMPLETED | OUTPATIENT
Start: 2024-03-12 | End: 2024-03-12

## 2024-03-12 RX ORDER — CEPHALEXIN 500 MG/1
500 CAPSULE ORAL 3 TIMES DAILY
Qty: 21 CAPSULE | Refills: 0 | Status: SHIPPED | OUTPATIENT
Start: 2024-03-12 | End: 2024-03-19

## 2024-03-12 RX ADMIN — LIDOCAINE HYDROCHLORIDE AND EPINEPHRINE 2 ML: 10; 10 INJECTION, SOLUTION INFILTRATION; PERINEURAL at 11:51

## 2024-03-12 ASSESSMENT — PAIN SCALES - GENERAL: PAINLEVEL: MILD PAIN (3)

## 2024-03-12 NOTE — PROGRESS NOTES
"  Assessment & Plan     Infected cyst of skin    - lidocaine 1% with EPINEPHrine 1:100,000 injection 2 mL  - cephALEXin (KEFLEX) 500 MG capsule; Take 1 capsule (500 mg) by mouth 3 times daily for 7 days  - Abscess Aerobic Bacterial Culture Routine Without Gram Stain  - Skin Abscess, Simple [17567]    Morbid obesity (H)  Weight loss is recommended by improving his diet and exercise    DIAGNOSIS NOT YET DEFINED      This chart documentation was completed in part with Dragon voice recognition software.  Documentation is reviewed after completion, however, some words and grammatical errors may remain.  Latricia Jewell PA-C        BMI  Estimated body mass index is 46.47 kg/m  as calculated from the following:    Height as of this encounter: 1.702 m (5' 7\").    Weight as of this encounter: 134.6 kg (296 lb 11.2 oz).   Weight management plan: Discussed healthy diet and exercise guidelines          Pedro Caldera is a 53 year old, presenting for the following health issues:  Cyst      3/12/2024    11:15 AM   Additional Questions   Roomed by Viky BERMUDEZ   Accompanied by self     History of Present Illness       Reason for visit:  Back cyst  Symptom onset:  1-2 weeks ago    He eats 0-1 servings of fruits and vegetables daily.He consumes 1 sweetened beverage(s) daily.He exercises with enough effort to increase his heart rate 20 to 29 minutes per day.  He exercises with enough effort to increase his heart rate 4 days per week. He is missing 1 dose(s) of medications per week.         Concern - Cyst on upper back (kind of by shoulder blade, there ar 2)  Onset: 1-2 weeks ago  Description: the one he is here about causes pain over shoulder blade, the other one is there but doesn't cause pain- more upper mid back  Intensity: mild would rate 3/10 right now but it depends because it can increase, it was really intense yesterday so has been taking Advil to help which does help temoroarily  Progression of Symptoms:  worsening and " "constant (worsening as in getting bigger)  Accompanying Signs & Symptoms: cyst that is uncomfortable, wanted it popped and asked wife if it was something she could pop but she said no  Previous history of similar problem: had a few, one on the other side of his back maybe 15 years ago or more though and had to have it squeezed to get some stuff out and the other one that doesn't cause discomfort has been there for quite a while but that one is not bothering him enough to have had something done with it by now  Therapies tried and outcome: Put heat on it to see if it would help drain anything out or help to pop it but nothing, Lidocaine for the pain and then just been trying Advil. The Advil helps take away some discomfort.          Review of Systems  Constitutional, HEENT, cardiovascular, pulmonary, gi and gu systems are negative, except as otherwise noted.      Objective    /82   Pulse 85   Temp 98.4  F (36.9  C) (Temporal)   Resp 16   Ht 1.702 m (5' 7\")   Wt 134.6 kg (296 lb 11.2 oz)   SpO2 95%   BMI 46.47 kg/m    Body mass index is 46.47 kg/m .  Physical Exam   GENERAL: alert and no distress  SKIN: Just to the right of his spine and his upper back he does have a visible cyst that is several centimeters in diameter this is nonerythematous and nontender    Overlying his left scapula he has a large 10-15 cm, erythematous, warm and tender infection it is fluctuant, there is a purulent head that is noted    No results found for any visits on 03/12/24.        Signed Electronically by: Latricia Jewell PA-C    Subjective: Ruperto Mcbride a 53 year old male who presents today for I and D. The lesion is located on the back,  and measures 10-15cm.  He denies other significant symptoms on ROS. Medications reviewed.    Objective: The area was prepped and appropriately anesthetized. Using the usual technique, I & D  was performed.  Copious amounts of purulent material was expressed and culture collected, I did " pack the wound with a small amount of gauze.  A large Band-Aid was applied.  The procedure was well tolerated and without complications. Specimen---culture  was sent.  No tissues sample obtained    Assessment: skin abscess    Plan: Follow up: He will apply warm pack as prescribed.  About 48 hours he can remove the gauze but does not follow it on his own in the meantime, after this time he can shower normally wound care instructions provided.  Patient was instructed to be alert for any signs of progressive worsening of cutaneous infection.

## 2024-03-15 LAB
BACTERIA ABSC ANAEROBE+AEROBE CULT: ABNORMAL
BACTERIA ABSC ANAEROBE+AEROBE CULT: ABNORMAL

## 2024-06-19 ENCOUNTER — PATIENT OUTREACH (OUTPATIENT)
Dept: CARE COORDINATION | Facility: CLINIC | Age: 54
End: 2024-06-19
Payer: COMMERCIAL

## 2024-06-30 DIAGNOSIS — I10 ESSENTIAL HYPERTENSION: ICD-10-CM

## 2024-07-01 RX ORDER — LISINOPRIL 10 MG/1
10 TABLET ORAL DAILY
Qty: 90 TABLET | Refills: 1 | Status: SHIPPED | OUTPATIENT
Start: 2024-07-01

## 2024-07-03 ENCOUNTER — PATIENT OUTREACH (OUTPATIENT)
Dept: CARE COORDINATION | Facility: CLINIC | Age: 54
End: 2024-07-03
Payer: COMMERCIAL

## 2024-09-08 ENCOUNTER — HEALTH MAINTENANCE LETTER (OUTPATIENT)
Age: 54
End: 2024-09-08

## 2025-01-06 ENCOUNTER — OFFICE VISIT (OUTPATIENT)
Dept: FAMILY MEDICINE | Facility: CLINIC | Age: 55
End: 2025-01-06
Payer: COMMERCIAL

## 2025-01-06 VITALS
HEART RATE: 76 BPM | OXYGEN SATURATION: 98 % | SYSTOLIC BLOOD PRESSURE: 126 MMHG | RESPIRATION RATE: 15 BRPM | DIASTOLIC BLOOD PRESSURE: 82 MMHG | BODY MASS INDEX: 44.36 KG/M2 | WEIGHT: 282.6 LBS | HEIGHT: 67 IN | TEMPERATURE: 99.3 F

## 2025-01-06 DIAGNOSIS — E78.5 HYPERLIPIDEMIA LDL GOAL <100: ICD-10-CM

## 2025-01-06 DIAGNOSIS — Z13.220 SCREENING FOR HYPERLIPIDEMIA: ICD-10-CM

## 2025-01-06 DIAGNOSIS — E66.01 MORBID OBESITY (H): ICD-10-CM

## 2025-01-06 DIAGNOSIS — Z12.11 SCREEN FOR COLON CANCER: ICD-10-CM

## 2025-01-06 DIAGNOSIS — Z13.1 SCREENING FOR DIABETES MELLITUS: ICD-10-CM

## 2025-01-06 DIAGNOSIS — I10 ESSENTIAL HYPERTENSION: Primary | ICD-10-CM

## 2025-01-06 PROCEDURE — 99214 OFFICE O/P EST MOD 30 MIN: CPT | Performed by: PHYSICIAN ASSISTANT

## 2025-01-06 RX ORDER — LISINOPRIL 20 MG/1
20 TABLET ORAL DAILY
Qty: 90 TABLET | Refills: 0 | Status: SHIPPED | OUTPATIENT
Start: 2025-01-06

## 2025-01-06 RX ORDER — CHLORTHALIDONE 25 MG/1
25 TABLET ORAL DAILY
Qty: 90 TABLET | Refills: 3 | Status: SHIPPED | OUTPATIENT
Start: 2025-01-06

## 2025-01-06 ASSESSMENT — PAIN SCALES - GENERAL: PAINLEVEL_OUTOF10: NO PAIN (0)

## 2025-01-06 NOTE — PROGRESS NOTES
"  Assessment & Plan     Essential hypertension  Increase dosage of lisinopril to 20 mg for now as he loses weight may  reduced dosage to 10 mg once again continue current dosage of chlorthalidone  - chlorthalidone (HYGROTON) 25 MG tablet; Take 1 tablet (25 mg) by mouth daily.  - lisinopril (ZESTRIL) 20 MG tablet; Take 1 tablet (20 mg) by mouth daily.    Morbid obesity (H)  Discussed at length foods to eat more foods to avoid exercise recommendations in order to lose weight weight loss will improve his cholesterol and also his hypertension.    Hyperlipidemia LDL goal <100  Cardiovascular risk score is 10%, he would like to work hard with diet and exercise before making changes to his meds to include adding a statin   We will plan to recheck lipids and blood sugar in 3 months  Screen for colon cancer  Patient is due, he will be called to schedule  - Colonoscopy Screening  Referral; Future      This chart documentation was completed in part with Dragon voice recognition software.  Documentation is reviewed after completion, however, some words and grammatical errors may remain.  Latricia Jewell PA-C      BMI  Estimated body mass index is 44.62 kg/m  as calculated from the following:    Height as of this encounter: 1.695 m (5' 6.73\").    Weight as of this encounter: 128.2 kg (282 lb 9.6 oz).   Weight management plan: Discussed healthy diet and exercise guidelines      This chart documentation was completed in part with Dragon voice recognition software.  Documentation is reviewed after completion, however, some words and grammatical errors may remain.  Latricia Jewell PA-C      Pedro Caldera is a 54 year old, presenting for the following health issues:  Recheck Medication and Hypertension        1/6/2025    11:24 AM   Additional Questions   Roomed by Erendira BERMUDEZ   Accompanied by None         1/6/2025    11:24 AM   Patient Reported Additional Medications   Patient reports taking the following new medications NA " "    History of Present Illness       Reason for visit:  Cloudy head  Symptom onset:  3-4 weeks ago   He is taking medications regularly.   He is taking Augmentin for a sinus infection with some benefits.   No issues with taking his antibiotic at this time.  Perhaps this is  The 10-year ASCVD risk score (Kaycee JIN, et al., 2019) is: 9%    Values used to calculate the score:      Age: 54 years      Sex: Male      Is Non- : No      Diabetic: No      Tobacco smoker: No      Systolic Blood Pressure: 126 mmHg      Is BP treated: Yes      HDL Cholesterol: 39 mg/dL      Total Cholesterol: 225 mg/dL   He has not been on any meds for lipids.   He is not wanting to start meds right away but would like to improve lifestyle habits.  In the past he has tried to lose weight he is eating primarily protein in the form of all types of meat including scaling chicken.  He does eat a lot of cheese as well.  He admits over the holidays he had too much food and has not been exercising.  Also he is cutting out alcohol at this time.    Hypertension Follow-up-    Do you check your blood pressure regularly outside of the clinic? No   Are you following a low salt diet? No  Are your blood pressures ever more than 140 on the top number (systolic) OR more   than 90 on the bottom number (diastolic), for example 140/90? No    In the past he was on 20 mg of lisinopril but with weight loss previously we were able to reduce his dose to 10 mg.  He does not check his blood pressures but can start doing so      Review of Systems  Constitutional, HEENT, cardiovascular, pulmonary, gi and gu systems are negative, except as otherwise noted.      Objective    /82   Pulse 76   Temp 99.3  F (37.4  C) (Temporal)   Resp 15   Ht 1.695 m (5' 6.73\")   Wt 128.2 kg (282 lb 9.6 oz)   SpO2 98%   BMI 44.62 kg/m    Body mass index is 44.62 kg/m .  Physical Exam   GENERAL: alert and no distress  NECK: no adenopathy, no asymmetry, " masses, or scars  RESP: lungs clear to auscultation - no rales, rhonchi or wheezes  CV: regular rate and rhythm, normal S1 S2, no S3 or S4, no murmur, click or rub, no peripheral edema  ABDOMEN: soft, nontender, no hepatosplenomegaly, no masses and bowel sounds normal  MS: no gross musculoskeletal defects noted, no edema  SKIN: Large cyst just to the right of his upper back  PSYCH: mentation appears normal, affect normal/bright    Lab on 01/03/2025   Component Date Value Ref Range Status    Cholesterol 01/03/2025 225 (H)  <200 mg/dL Final    Triglycerides 01/03/2025 259 (H)  <150 mg/dL Final    Direct Measure HDL 01/03/2025 39 (L)  >=40 mg/dL Final    LDL Cholesterol Calculated 01/03/2025 134 (H)  <100 mg/dL Final    Non HDL Cholesterol 01/03/2025 186 (H)  <130 mg/dL Final    Patient Fasting > 8hrs? 01/03/2025 Yes   Final    WBC Count 01/03/2025 5.0  4.0 - 11.0 10e3/uL Final    RBC Count 01/03/2025 5.31  4.40 - 5.90 10e6/uL Final    Hemoglobin 01/03/2025 16.1  13.3 - 17.7 g/dL Final    Hematocrit 01/03/2025 46.0  40.0 - 53.0 % Final    MCV 01/03/2025 87  78 - 100 fL Final    MCH 01/03/2025 30.3  26.5 - 33.0 pg Final    MCHC 01/03/2025 35.0  31.5 - 36.5 g/dL Final    RDW 01/03/2025 13.3  10.0 - 15.0 % Final    Platelet Count 01/03/2025 165  150 - 450 10e3/uL Final    Sodium 01/03/2025 137  135 - 145 mmol/L Final    Potassium 01/03/2025 4.2  3.4 - 5.3 mmol/L Final    Carbon Dioxide (CO2) 01/03/2025 29  22 - 29 mmol/L Final    Anion Gap 01/03/2025 12  7 - 15 mmol/L Final    Urea Nitrogen 01/03/2025 13.3  6.0 - 20.0 mg/dL Final    Creatinine 01/03/2025 1.14  0.67 - 1.17 mg/dL Final    GFR Estimate 01/03/2025 76  >60 mL/min/1.73m2 Final    eGFR calculated using 2021 CKD-EPI equation.    Calcium 01/03/2025 9.9  8.8 - 10.4 mg/dL Final    Reference intervals for this test were updated on 7/16/2024 to reflect our healthy population more accurately. There may be differences in the flagging of prior results with similar values  performed with this method. Those prior results can be interpreted in the context of the updated reference intervals.    Chloride 01/03/2025 96 (L)  98 - 107 mmol/L Final    Glucose 01/03/2025 105 (H)  70 - 99 mg/dL Final    Alkaline Phosphatase 01/03/2025 70  40 - 150 U/L Final    AST 01/03/2025 35  0 - 45 U/L Final    ALT 01/03/2025 32  0 - 70 U/L Final    Protein Total 01/03/2025 7.7  6.4 - 8.3 g/dL Final    Albumin 01/03/2025 4.4  3.5 - 5.2 g/dL Final    Bilirubin Total 01/03/2025 0.6  <=1.2 mg/dL Final    Patient Fasting > 8hrs? 01/03/2025 Yes   Final    Prostate Specific Antigen Screen 01/03/2025 1.06  0.00 - 3.50 ng/mL Final           Signed Electronically by: Latricia Jewell PA-C

## 2025-01-15 ENCOUNTER — PATIENT OUTREACH (OUTPATIENT)
Dept: CARE COORDINATION | Facility: CLINIC | Age: 55
End: 2025-01-15
Payer: COMMERCIAL

## 2025-01-28 ENCOUNTER — TELEPHONE (OUTPATIENT)
Dept: GASTROENTEROLOGY | Facility: CLINIC | Age: 55
End: 2025-01-28
Payer: COMMERCIAL

## 2025-01-28 NOTE — TELEPHONE ENCOUNTER
"Endoscopy Scheduling Screen    Have you had any respiratory illness or flu-like symptoms in the last 10 days?  No    What is your communication preference for Instructions and/or Bowel Prep?   MyChart    What insurance is in the chart?  Other:  [facility]    Ordering/Referring Provider: APURVA PIZARRO   (If ordering provider performs procedure, schedule with ordering provider unless otherwise instructed. )    BMI: Estimated body mass index is 44.62 kg/m  as calculated from the following:    Height as of 1/6/25: 1.695 m (5' 6.73\").    Weight as of 1/6/25: 128.2 kg (282 lb 9.6 oz).     Sedation Ordered  moderate sedation.   If patient BMI > 50 do not schedule in [facility].    If patient BMI > 45 [facility]    Are you taking methadone or Suboxone?  NO, No RN review required.    Have you been diagnosed and are being treated for severe PTSD or severe anxiety?  NO, No RN review required.    Are you taking any prescription medications for pain 3 or more times per week?   NO, No RN review required.    Do you have a history of malignant hyperthermia?  No    (Females) Are you currently pregnant?   No     Have you been diagnosed or told you have pulmonary hypertension?   No    Do you have an LVAD?  No    Have you been told you have moderate to severe sleep apnea?  No.    Have you been told you have COPD, asthma, or any other lung disease?  No    Do you have any heart conditions?  No     Have you ever had or are you waiting for an organ transplant?  No. Continue scheduling, no site restrictions.    Have you had a stroke or transient ischemic attack (TIA aka \"mini stroke\" in the last 6 months?   No    Have you been diagnosed with or been told you have cirrhosis of the liver?   No.    Are you currently on dialysis?   No    Do you need assistance transferring?   No    BMI: Estimated body mass index is 44.62 kg/m  as calculated from the following:    Height as of 1/6/25: 1.695 m (5' 6.73\").    Weight as of 1/6/25: 128.2 kg (282 " lb 9.6 oz).     Is patients BMI > 40 and scheduling location [facility]?  No    Do you take an injectable or oral medication for weight loss or diabetes (excluding insulin)?  No    Do you take the medication Naltrexone?  No    Do you take blood thinners?  No       Prep   Are you currently on dialysis or do you have chronic kidney disease?  No    Do you have a diagnosis of diabetes?  No    Do you have a diagnosis of cystic fibrosis (CF)?  No    On a regular basis do you go 3 -5 days between bowel movements?  No    BMI > 40?  Yes (Extended Prep)    Preferred Pharmacy:    [id number] - [place], [place] - [address]  [address]  [place] [address]  Phone: [phone] Fax: [phone]    Final Scheduling Details     Procedure scheduled  Colonoscopy    Surgeon:  KIARA     Date of procedure:  3/10/25     Pre-OP / PAC:   No - Not required for this site.    Location  [facility] - Per order.    Sedation   MAC/Deep Sedation  Per location.      Patient Reminders:   You will receive a call from a Nurse to review instructions and health history.  This assessment must be completed prior to your procedure.  Failure to complete the Nurse assessment may result in the procedure being cancelled.      On the day of your procedure, please designate an adult(s) who can drive you home stay with you for the next 24 hours. The medicines used in the exam will make you sleepy. You will not be able to drive.      You cannot take public transportation, ride share services, or non-medical taxi service without a responsible caregiver.  Medical transport services are allowed with the requirement that a responsible caregiver will receive you at your destination.  We require that drivers and caregivers are confirmed prior to your procedure.

## 2025-02-21 ENCOUNTER — TELEPHONE (OUTPATIENT)
Dept: GASTROENTEROLOGY | Facility: CLINIC | Age: 55
End: 2025-02-21
Payer: COMMERCIAL

## 2025-02-21 DIAGNOSIS — Z12.11 SPECIAL SCREENING FOR MALIGNANT NEOPLASMS, COLON: Primary | ICD-10-CM

## 2025-02-21 RX ORDER — BISACODYL 5 MG/1
TABLET, DELAYED RELEASE ORAL
Qty: 4 TABLET | Refills: 0 | Status: SHIPPED | OUTPATIENT
Start: 2025-02-21 | End: 2025-02-24

## 2025-02-21 NOTE — TELEPHONE ENCOUNTER
Pre visit planning completed.      Procedure details:    Patient scheduled for Colonoscopy on 3/10/25.     Arrival time: 0800. Procedure time 0900    Facility location: [facility]; [address] , [place] [address]. Check in location: Main entrance at Surgery registation desk.    Sedation type: MAC    Pre op exam needed? No.    Indication for procedure: Screening, hx of polyps      Chart review:     Electronic implanted devices? No    Recent diagnosis of diverticulitis within the last 6 weeks? No      Medication review:    Diabetic? No    Anticoagulants? No    Weight loss medication/injectable? No GLP-1 medication per patient's medication list. Nursing to verify with pre-assessment call.    Other medication HOLDING recommendations:  N/A      Prep for procedure:     Bowel prep recommendation: Extended Golytely. Bowel prep sent to [id number] - [place] - [address]  Due to: BMI > 40    Procedure information and instructions sent via maddie Meade LPN  Endoscopy Procedure Pre Assessment   [phone] option 3

## 2025-02-24 NOTE — TELEPHONE ENCOUNTER
Pre assessment completed for upcoming procedure.   (Please see previous telephone encounter notes for complete details)      Procedure details:    Arrival time and facility location reviewed.    Pre op exam needed? No.    Designated  policy reviewed. Instructed to have someone stay 24  hours post procedure.       Medication review:    Medications reviewed. Please see supporting documentation below. Holding recommendations discussed (if applicable).       Prep for procedure:     Procedure prep instructions reviewed.   Patient states has lost more weight since BMI was calculated.  Current weight is 248, new BMI is 39.1.  Updated prep instructions sent to patient for Standard Miralax.      Any additional information needed:  N/A      Patient verbalized understanding and had no questions or concerns at this time.      Maryjane Meade LPN  Endoscopy Procedure Pre Assessment   [phone] option 3

## 2025-03-05 ENCOUNTER — OFFICE VISIT (OUTPATIENT)
Dept: FAMILY MEDICINE | Facility: CLINIC | Age: 55
End: 2025-03-05
Payer: COMMERCIAL

## 2025-03-05 ENCOUNTER — NURSE TRIAGE (OUTPATIENT)
Dept: FAMILY MEDICINE | Facility: CLINIC | Age: 55
End: 2025-03-05

## 2025-03-05 VITALS
BODY MASS INDEX: 38.92 KG/M2 | OXYGEN SATURATION: 94 % | WEIGHT: 248 LBS | HEART RATE: 70 BPM | RESPIRATION RATE: 18 BRPM | SYSTOLIC BLOOD PRESSURE: 134 MMHG | DIASTOLIC BLOOD PRESSURE: 76 MMHG | TEMPERATURE: 97.2 F | HEIGHT: 67 IN

## 2025-03-05 DIAGNOSIS — J11.1 INFLUENZA-LIKE ILLNESS: Primary | ICD-10-CM

## 2025-03-05 LAB
FLUAV AG SPEC QL IA: NEGATIVE
FLUBV AG SPEC QL IA: NEGATIVE

## 2025-03-05 PROCEDURE — 99213 OFFICE O/P EST LOW 20 MIN: CPT | Performed by: PHYSICIAN ASSISTANT

## 2025-03-05 PROCEDURE — 1126F AMNT PAIN NOTED NONE PRSNT: CPT | Performed by: PHYSICIAN ASSISTANT

## 2025-03-05 PROCEDURE — 3075F SYST BP GE 130 - 139MM HG: CPT | Performed by: PHYSICIAN ASSISTANT

## 2025-03-05 PROCEDURE — 87804 INFLUENZA ASSAY W/OPTIC: CPT | Performed by: PHYSICIAN ASSISTANT

## 2025-03-05 PROCEDURE — 3078F DIAST BP <80 MM HG: CPT | Performed by: PHYSICIAN ASSISTANT

## 2025-03-05 ASSESSMENT — PAIN SCALES - GENERAL: PAINLEVEL_OUTOF10: NO PAIN (0)

## 2025-03-05 NOTE — TELEPHONE ENCOUNTER
"Nurse Triage SBAR    Is this a 2nd Level Triage? NO    Situation: Cough and chest congestion     Background: Patient states he has had a productive cough since Friday 02/28/2025. Patient states he was sick in December and January was given abx.     Assessment: Patient reports productive cough, chest congestion, runny/congested nose, has to sleep in an upright position - recliner. Patient denies SOB, chest pain, chest discomfort, chest pain, fever.     Protocol Recommended Disposition:   See in Office Today or Tomorrow Patient wants to be seen and have a chest x-ray.  Appointment made for patient today with Loyda Henderson with arrival time of 0940 in [place]. Patient verbalized understanding, agreeable to plan and no further questions at this time.    Recommendation: Follow up at scheduled appointment today    Ayala Cervantes RN on 3/5/2025 at 9:03 AM      Reason for Disposition   Continuous (nonstop) coughing interferes with work or school and no improvement using cough treatment per Care Advice    Answer Assessment - Initial Assessment Questions  1. ONSET: \"When did the cough begin?\"       Friday 02/28/2025  2. SEVERITY: \"How bad is the cough today?\"       Moderate   3. SPUTUM: \"Describe the color of your sputum\" (e.g., none, dry cough; clear, white, yellow, green)      Clear, green  4. HEMOPTYSIS: \"Are you coughing up any blood?\" If Yes, ask: \"How much?\" (e.g., flecks, streaks, tablespoons, etc.)      Denies  5. DIFFICULTY BREATHING: \"Are you having difficulty breathing?\" If Yes, ask: \"How bad is it?\" (e.g., mild, moderate, severe)       Denies  6. FEVER: \"Do you have a fever?\" If Yes, ask: \"What is your temperature, how was it measured, and when did it start?\"      Denies  7. CARDIAC HISTORY: \"Do you have any history of heart disease?\" (e.g., heart attack, congestive heart failure)       Denies  8. LUNG HISTORY: \"Do you have any history of lung disease?\"  (e.g., pulmonary embolus, asthma, emphysema)      " "Denies  9. PE RISK FACTORS: \"Do you have a history of blood clots?\" (or: recent major surgery, recent prolonged travel, bedridden)      Denies  10. OTHER SYMPTOMS: \"Do you have any other symptoms?\" (e.g., runny nose, wheezing, chest pain)        Reports runny nose  Denies wheezing, chest pain    Protocols used: Cough-A-OH    "

## 2025-03-05 NOTE — RESULT ENCOUNTER NOTE
Gavin Caldera,  Your influenza test is negative and I suspect this is another of the several viruses that are circulating. You can consider checking a COVID test at home but will likely not affect treatment. Take the Mucinex DM and benzonatate for cough.  Drink plenty of fluids and get as much rest as possible.  It was a pleasure meeting you and we appreciate your choosing the [facility].    Nikia Henderson PA-C

## 2025-03-05 NOTE — PATIENT INSTRUCTIONS
Your influenza test is negative. On your exam I hear no evidence of pneumonia and I do not think an antibiotic is needed at this time.  This appears to be a viral illness.    Fluids, rest, tylenol or ibuprofen as needed.   Symptomatic treatment can include:  For cough:  Mucinex DM and the benzonatate    Saline nasal spray and humidifier are recommended.  Hot water lemon juice and honey can be helpful for cough.     Consider follow up if you develop chest pain, shortness of breath,  or persistent/worsening symptoms    It was a pleasure meeting you and we appreciate your choosing the [facility].    Nikia Henderson PA-C

## 2025-03-05 NOTE — PROGRESS NOTES
Assessment & Plan     Influenza-like illness  - Influenza A & B Antigen - Clinic Collect      Patient Instructions   Your influenza test is negative. On your exam I hear no evidence of pneumonia and I do not think an antibiotic is needed at this time.  This appears to be a viral illness.    Fluids, rest, tylenol or ibuprofen as needed.   Symptomatic treatment can include:  For cough:  Mucinex DM and the benzonatate    Saline nasal spray and humidifier are recommended.  Hot water lemon juice and honey can be helpful for cough.     Consider follow up if you develop chest pain, shortness of breath,  or persistent/worsening symptoms    It was a pleasure meeting you and we appreciate your choosing the [facility].    Nikia Vasquez   Werner is a 54 year old with HTN-well controlled, HLD and obesity, presenting for the following health issues:    Cough and nasal congestion. No fever. Symptoms started 3 days ago. Feels congested in chest.  Denies shortness of breath and chest pain.No history of asthma or smoking. Whole family has been sick.  They have had symptoms for more than 10 days.  He had similar illness last year and it continued for 3 weeks-he came in for antibiotic and is wondering if that would be helpful now.    OTC meds--Delsym, benzonatate.  Nyquil.    URI        3/5/2025     9:57 AM   Additional Questions   Roomed by Nataliia Medrano CMA   Accompanied by self         3/5/2025     9:57 AM   Patient Reported Additional Medications   Patient reports taking the following new medications none     History of Present Illness       Reason for visit:  Respitory illness  Symptom onset:  3-7 days ago  Symptoms include:  Cough,runny nose  Symptom intensity:  Moderate  Symptom progression:  Worsening  Had these symptoms before:  Yes  Has tried/received treatment for these symptoms:  Yes  Previous treatment was successful:  Yes  Prior treatment description:  Augmentin  What makes it worse:   "No  What makes it better:  Coughing out the phlegm   He is taking medications regularly.      Acute Illness  Acute illness concerns: URI  Onset/Duration: about 5 days   Symptoms:  Fever: No  Chills/Sweats: No  Headache (location?): No  Sinus Pressure: YES  Conjunctivitis:  No  Ear Pain: no  Rhinorrhea: YES  Congestion: YES  Sore Throat: No  Cough: YES-productive of yellow sputum--in the morning  Wheeze: YES  Decreased Appetite: No  Nausea: No  Vomiting: No  Diarrhea: No  Dysuria/Freq.: No  Dysuria or Hematuria: No  Fatigue/Achiness: YES- fatigue   Sick/Strep Exposure: YES- family at home has this too  Therapies tried and outcome: netti pot, louamy showers          Objective    /76   Pulse 70   Temp 97.2  F (36.2  C) (Temporal)   Resp 18   Ht 1.695 m (5' 6.73\")   Wt 112.5 kg (248 lb)   SpO2 94%   BMI 39.15 kg/m    Body mass index is 39.15 kg/m .  Physical Exam  Vitals reviewed.   Constitutional:       Appearance: Normal appearance. He is obese.   HENT:      Right Ear: Tympanic membrane and ear canal normal.      Left Ear: Tympanic membrane and ear canal normal.      Nose: Congestion and rhinorrhea (Clear) present.      Comments: No tenderness with palpation over sinuses.     Mouth/Throat:      Mouth: Mucous membranes are moist.      Pharynx: No oropharyngeal exudate or posterior oropharyngeal erythema.   Eyes:      Conjunctiva/sclera: Conjunctivae normal.   Cardiovascular:      Rate and Rhythm: Normal rate and regular rhythm.      Pulses: Normal pulses.      Heart sounds: Normal heart sounds.   Pulmonary:      Effort: Pulmonary effort is normal.      Comments: Harsh breath sounds without crackle.  Upper aorway congestion noted with forced expiration.  Musculoskeletal:         General: Normal range of motion.      Cervical back: Normal range of motion.   Lymphadenopathy:      Cervical: No cervical adenopathy.   Skin:     General: Skin is warm and dry.   Neurological:      Mental Status: He is alert. "   Psychiatric:         Mood and Affect: Mood normal.         Behavior: Behavior normal.              Signed Electronically by: Loyda Henderson PA-C

## 2025-03-10 ENCOUNTER — ANESTHESIA EVENT (OUTPATIENT)
Dept: GASTROENTEROLOGY | Facility: CLINIC | Age: 55
End: 2025-03-10
Payer: COMMERCIAL

## 2025-03-10 ENCOUNTER — HOSPITAL ENCOUNTER (OUTPATIENT)
Facility: CLINIC | Age: 55
Discharge: HOME | End: 2025-03-10
Attending: SPECIALIST | Admitting: SPECIALIST
Payer: COMMERCIAL

## 2025-03-10 ENCOUNTER — ANESTHESIA (OUTPATIENT)
Dept: GASTROENTEROLOGY | Facility: CLINIC | Age: 55
End: 2025-03-10
Payer: COMMERCIAL

## 2025-03-10 VITALS
DIASTOLIC BLOOD PRESSURE: 73 MMHG | SYSTOLIC BLOOD PRESSURE: 110 MMHG | RESPIRATION RATE: 18 BRPM | HEART RATE: 66 BPM | OXYGEN SATURATION: 93 % | TEMPERATURE: 98.2 F

## 2025-03-10 LAB — COLONOSCOPY: NORMAL

## 2025-03-10 PROCEDURE — 45385 COLONOSCOPY W/LESION REMOVAL: CPT | Mod: PT | Performed by: SPECIALIST

## 2025-03-10 PROCEDURE — 88305 TISSUE EXAM BY PATHOLOGIST: CPT | Mod: TC | Performed by: SPECIALIST

## 2025-03-10 PROCEDURE — 250N000011 HC RX IP 250 OP 636: Performed by: NURSE ANESTHETIST, CERTIFIED REGISTERED

## 2025-03-10 PROCEDURE — 45380 COLONOSCOPY AND BIOPSY: CPT | Performed by: SPECIALIST

## 2025-03-10 PROCEDURE — 370N000017 HC ANESTHESIA TECHNICAL FEE, PER MIN: Performed by: SPECIALIST

## 2025-03-10 PROCEDURE — 250N000009 HC RX 250: Performed by: NURSE ANESTHETIST, CERTIFIED REGISTERED

## 2025-03-10 PROCEDURE — 258N000003 HC RX IP 258 OP 636: Performed by: NURSE ANESTHETIST, CERTIFIED REGISTERED

## 2025-03-10 RX ORDER — PROPOFOL 10 MG/ML
INJECTION, EMULSION INTRAVENOUS CONTINUOUS PRN
Status: DISCONTINUED | OUTPATIENT
Start: 2025-03-10 | End: 2025-03-10

## 2025-03-10 RX ORDER — SODIUM CHLORIDE, SODIUM LACTATE, POTASSIUM CHLORIDE, CALCIUM CHLORIDE 600; 310; 30; 20 MG/100ML; MG/100ML; MG/100ML; MG/100ML
INJECTION, SOLUTION INTRAVENOUS CONTINUOUS
Status: DISCONTINUED | OUTPATIENT
Start: 2025-03-10 | End: 2025-03-10 | Stop reason: HOSPADM

## 2025-03-10 RX ORDER — LIDOCAINE 40 MG/G
CREAM TOPICAL
Status: DISCONTINUED | OUTPATIENT
Start: 2025-03-10 | End: 2025-03-10 | Stop reason: HOSPADM

## 2025-03-10 RX ORDER — IBUPROFEN 800 MG/1
800 TABLET, FILM COATED ORAL EVERY 8 HOURS PRN
COMMUNITY

## 2025-03-10 RX ORDER — LIDOCAINE HYDROCHLORIDE 10 MG/ML
INJECTION, SOLUTION INFILTRATION; PERINEURAL PRN
Status: DISCONTINUED | OUTPATIENT
Start: 2025-03-10 | End: 2025-03-10

## 2025-03-10 RX ORDER — PROPOFOL 10 MG/ML
INJECTION, EMULSION INTRAVENOUS PRN
Status: DISCONTINUED | OUTPATIENT
Start: 2025-03-10 | End: 2025-03-10

## 2025-03-10 RX ADMIN — LIDOCAINE HYDROCHLORIDE 50 MG: 10 INJECTION, SOLUTION INFILTRATION; PERINEURAL at 08:50

## 2025-03-10 RX ADMIN — PROPOFOL 200 MCG/KG/MIN: 10 INJECTION, EMULSION INTRAVENOUS at 08:50

## 2025-03-10 RX ADMIN — LIDOCAINE HYDROCHLORIDE 0.1 ML: 10 INJECTION, SOLUTION EPIDURAL; INFILTRATION; INTRACAUDAL; PERINEURAL at 08:41

## 2025-03-10 RX ADMIN — PROPOFOL 50 MG: 10 INJECTION, EMULSION INTRAVENOUS at 08:50

## 2025-03-10 RX ADMIN — SODIUM CHLORIDE, SODIUM LACTATE, POTASSIUM CHLORIDE, AND CALCIUM CHLORIDE: .6; .31; .03; .02 INJECTION, SOLUTION INTRAVENOUS at 08:41

## 2025-03-10 RX ADMIN — PROPOFOL 50 MG: 10 INJECTION, EMULSION INTRAVENOUS at 08:49

## 2025-03-10 ASSESSMENT — ACTIVITIES OF DAILY LIVING (ADL): ADLS_ACUITY_SCORE: 41

## 2025-03-10 ASSESSMENT — LIFESTYLE VARIABLES: TOBACCO_USE: 1

## 2025-03-10 NOTE — H&P
[facility] History and Physical    Ruperto Mcbride MRN# [id number]   Age: 54 year old YOB: 1970     Date of Admission:  (Not on file)    Home clinic: [facility]  Primary care provider: Latricia Jewell          Impression and Plan:   Impression:   Screen for colon cancer [Z12.11]  Last colonoscopy 2021-multiple polyps      Plan:   Proceed to  Colonoscopy as planned.  The procedure, risks(bleeding, perforation), benefits and alternatives were discussed and the patient agrees to proceed. Cleared for Anesthesia\           Chief Complaint:   Screen for colon cancer [Z12.11]    History is obtained from the patient          History of Present Illness:   This 54 year old male is being seen at this time for evaluation for colonoscopy.   No complaints or family hx            Past Medical History:     Past Medical History:   Diagnosis Date    Hypertension 12/2023            Past Surgical History:     Past Surgical History:   Procedure Laterality Date    ABDOMEN SURGERY  12/19/2020    COLONOSCOPY  2021    HERNIA REPAIR  12/19/2020    Umbilical Hernia Repair    HERNIORRHAPHY, UMBILICAL, ROBOT-ASSISTED, LAPAROSCOPIC, USING DA DEIRDRE XI N/A 8/18/2023    Procedure: Robot-assisted laparoscopic recurrent incarcerated umbilical hernia repair with mesh;  Surgeon: Aravind Brito DO;  Location: [facility] OR    KNEE SURGERY      2013    TONSILLECTOMY      UMBILICAL HERNIA REPAIR      2020            Social History:     Social History     Tobacco Use    Smoking status: Former     Passive exposure: Past    Smokeless tobacco: Former     Types: Chew, Snuff     Quit date: 1/1/2019   Substance Use Topics    Alcohol use: Not Currently     Comment: No alcohol in 75 days            Family History:   No family history on file.         Immunizations:     VACCINE/DOSE   Diptheria   DPT   DTAP   HBIG   Hepatitis A   Hepatitis B   HIB   Influenza   Measles   Meningococcal   MMR   Mumps   Pneumococcal    Polio   Rubella   Small Pox   TDAP   Varicella   Zoster            Allergies:     Allergies   Allergen Reactions    Bee Venom Swelling     Other reaction(s): Swelling      Oxycodone-Acetaminophen Itching            Medications:     No current facility-administered medications for this encounter.     Current Outpatient Medications   Medication Sig Dispense Refill    chlorthalidone (HYGROTON) 25 MG tablet Take 1 tablet (25 mg) by mouth daily. 90 tablet 3    fluticasone (FLONASE) 50 MCG/ACT nasal spray Spray 2 sprays into both nostrils daily. 9.9 mL 0    lisinopril (ZESTRIL) 20 MG tablet Take 1 tablet (20 mg) by mouth daily. 90 tablet 0             Review of Systems:   The review of systems was positive for the following findings.  None.  The remainder of the review of systems was unremarkable.          Physical Exam:   All vitals have been reviewed  There were no vitals taken for this visit.  No intake or output data in the 24 hours ending 03/09/25 1941  SHEENT examination revealed NC/AT, EOMI.  Examination of the chest revealed CTA.  Examination of the heart revealed RRR.  Examination of the abdomen revealed Soft, non tender.  The neuromuscular examination was NL.          Data:   All laboratory data reviewed  No results found for any visits on 03/10/25.  -     Jovi Benjamin MD, FACS

## 2025-03-10 NOTE — ANESTHESIA PREPROCEDURE EVALUATION
Anesthesia Pre-Procedure Evaluation    Patient: Ruperto Mcbride   MRN: [id number] : 1970        Procedure : Procedure(s):  Colonoscopy          Past Medical History:   Diagnosis Date    Hypertension 2023      Past Surgical History:   Procedure Laterality Date    ABDOMEN SURGERY  2020    COLONOSCOPY      HERNIA REPAIR  2020    Umbilical Hernia Repair    HERNIORRHAPHY, UMBILICAL, ROBOT-ASSISTED, LAPAROSCOPIC, USING DA DEIRDRE XI N/A 2023    Procedure: Robot-assisted laparoscopic recurrent incarcerated umbilical hernia repair with mesh;  Surgeon: Aravind Brito DO;  Location: [facility] OR    KNEE SURGERY          TONSILLECTOMY      UMBILICAL HERNIA REPAIR            Allergies   Allergen Reactions    Bee Venom Swelling     Other reaction(s): Swelling      Oxycodone-Acetaminophen Itching      Social History     Tobacco Use    Smoking status: Former     Passive exposure: Past    Smokeless tobacco: Former     Types: Chew, Snuff     Quit date: 2019   Substance Use Topics    Alcohol use: Not Currently     Comment: No alcohol in 75 days      Wt Readings from Last 1 Encounters:   25 112.5 kg (248 lb)        Anesthesia Evaluation   Pt has had prior anesthetic. Type: MAC and General.    No history of anesthetic complications       ROS/MED HX  ENT/Pulmonary:     (+)                tobacco use,                        Neurologic:  - neg neurologic ROS     Cardiovascular:     (+) Dyslipidemia hypertension- -   -  - -                                      METS/Exercise Tolerance: >4 METS    Hematologic:  - neg hematologic  ROS     Musculoskeletal:  - neg musculoskeletal ROS     GI/Hepatic:  - neg GI/hepatic ROS     Renal/Genitourinary:  - neg Renal ROS     Endo:     (+)               Obesity,       Psychiatric/Substance Use:  - neg psychiatric ROS     Infectious Disease:  - neg infectious disease ROS     Malignancy:  - neg malignancy ROS     Other:  - neg other ROS          Physical  "Exam    Airway  airway exam normal      Mallampati: II   TM distance: > 3 FB   Neck ROM: full   Mouth opening: > 3 cm    Respiratory Devices and Support         Dental  no notable dental history         Cardiovascular   cardiovascular exam normal       Rhythm and rate: regular and normal     Pulmonary   pulmonary exam normal        breath sounds clear to auscultation           OUTSIDE LABS:  CBC:   Lab Results   Component Value Date    WBC 5.0 01/03/2025    WBC 9.9 01/10/2023    HGB 16.1 01/03/2025    HGB 15.6 01/10/2023    HCT 46.0 01/03/2025    HCT 46.8 01/10/2023     01/03/2025     01/10/2023     BMP:   Lab Results   Component Value Date     01/03/2025     08/08/2023    POTASSIUM 4.2 01/03/2025    POTASSIUM 4.2 08/08/2023    CHLORIDE 96 (L) 01/03/2025    CHLORIDE 102 08/08/2023    CO2 29 01/03/2025    CO2 27 08/08/2023    BUN 13.3 01/03/2025    BUN 16.2 08/08/2023    CR 1.14 01/03/2025    CR 1.04 08/08/2023     (H) 01/03/2025     (H) 08/08/2023     COAGS: No results found for: \"PTT\", \"INR\", \"FIBR\"  POC: No results found for: \"BGM\", \"HCG\", \"HCGS\"  HEPATIC:   Lab Results   Component Value Date    ALBUMIN 4.4 01/03/2025    PROTTOTAL 7.7 01/03/2025    ALT 32 01/03/2025    AST 35 01/03/2025    ALKPHOS 70 01/03/2025    BILITOTAL 0.6 01/03/2025     OTHER:   Lab Results   Component Value Date    CARLYN 9.9 01/03/2025       Anesthesia Plan    ASA Status:  2    NPO Status:  NPO Appropriate    Anesthesia Type: MAC.   Induction: Propofol, Intravenous.   Maintenance: Balanced.        Consents    Anesthesia Plan(s) and associated risks, benefits, and realistic alternatives discussed. Questions answered and patient/representative(s) expressed understanding.     - Discussed:     - Discussed with:  Patient      - Extended Intubation/Ventilatory Support Discussed: No.      - Patient is DNR/DNI Status: No          Postoperative Care    Pain management: Multi-modal analgesia.   PONV prophylaxis: " "Background Propofol Infusion     Comments:    Other Comments: The risks and benefits of anesthesia, and the alternatives where applicable, have been discussed with the patient, and they wish to proceed.              GHULAM Diaz CRNA    I have reviewed the pertinent notes and labs in the chart from the past 30 days and (re)examined the patient.  Any updates or changes from those notes are reflected in this note.    Clinically Significant Risk Factors Present on Admission                   # Hypertension: Noted on problem list           # Obesity: Estimated body mass index is 39.15 kg/m  as calculated from the following:    Height as of 3/5/25: 1.695 m (5' 6.73\").    Weight as of 3/5/25: 112.5 kg (248 lb).                "

## 2025-03-10 NOTE — ANESTHESIA CARE TRANSFER NOTE
Patient: Ruperto Mcbride    Procedure: Procedure(s):  COLONOSCOPY, WITH POLYPECTOMY       Diagnosis: Screen for colon cancer [Z12.11]  Diagnosis Additional Information: No value filed.    Anesthesia Type:   MAC     Note:    Oropharynx: oropharynx clear of all foreign objects and spontaneously breathing  Level of Consciousness: awake  Oxygen Supplementation: room air    Independent Airway: airway patency satisfactory and stable  Dentition: dentition unchanged  Vital Signs Stable: post-procedure vital signs reviewed and stable  Report to RN Given: handoff report given  Patient transferred to: Phase II    Handoff Report: Identifed the Patient, Identified the Reponsible Provider, Reviewed the pertinent medical history, Discussed the surgical course, Reviewed Intra-OP anesthesia mangement and issues during anesthesia, Set expectations for post-procedure period and Allowed opportunity for questions and acknowledgement of understanding      Vitals:  Vitals Value Taken Time   /71 03/10/25 0920   Temp     Pulse 74 03/10/25 0920   Resp 18 03/10/25 0910   SpO2 95 % 03/10/25 0924   Vitals shown include unfiled device data.    Electronically Signed By: GHULAM Diaz CRNA  March 10, 2025  9:25 AM

## 2025-03-10 NOTE — ANESTHESIA POSTPROCEDURE EVALUATION
Patient: Ruperto Mcbride    Procedure: Procedure(s):  COLONOSCOPY, WITH POLYPECTOMY       Anesthesia Type:  MAC    Note:  Disposition: Outpatient   Postop Pain Control: Uneventful            Sign Out: Well controlled pain   PONV: No   Neuro/Psych: Uneventful            Sign Out: Acceptable/Baseline neuro status   Airway/Respiratory: Uneventful            Sign Out: Acceptable/Baseline resp. status   CV/Hemodynamics: Uneventful            Sign Out: Acceptable CV status   Other NRE: NONE   DID A NON-ROUTINE EVENT OCCUR? No    Event details/Postop Comments:  Pt was happy with anesthesia care.  No complications.  I will follow up with the pt if needed.           Last vitals:  Vitals Value Taken Time   /71 03/10/25 0920   Temp     Pulse 74 03/10/25 0920   Resp 18 03/10/25 0910   SpO2 92 % 03/10/25 0926   Vitals shown include unfiled device data.    Electronically Signed By: GHULAM Diaz CRNA  March 10, 2025  9:27 AM

## 2025-03-11 LAB
PATH REPORT.COMMENTS IMP SPEC: NORMAL
PATH REPORT.COMMENTS IMP SPEC: NORMAL
PATH REPORT.FINAL DX SPEC: NORMAL
PATH REPORT.GROSS SPEC: NORMAL
PATH REPORT.MICROSCOPIC SPEC OTHER STN: NORMAL
PATH REPORT.RELEVANT HX SPEC: NORMAL
PHOTO IMAGE: NORMAL

## 2025-03-11 PROCEDURE — 88305 TISSUE EXAM BY PATHOLOGIST: CPT | Mod: 26 | Performed by: PATHOLOGY

## 2025-03-24 PROBLEM — D12.6 ADENOMATOUS POLYP OF COLON: Status: ACTIVE | Noted: 2025-03-24

## 2025-04-07 ENCOUNTER — LAB (OUTPATIENT)
Dept: LAB | Facility: CLINIC | Age: 55
End: 2025-04-07
Payer: COMMERCIAL

## 2025-04-07 ENCOUNTER — MYC MEDICAL ADVICE (OUTPATIENT)
Dept: FAMILY MEDICINE | Facility: CLINIC | Age: 55
End: 2025-04-07

## 2025-04-07 DIAGNOSIS — E78.5 HYPERLIPIDEMIA LDL GOAL <100: ICD-10-CM

## 2025-04-07 DIAGNOSIS — I10 ESSENTIAL HYPERTENSION: ICD-10-CM

## 2025-04-07 DIAGNOSIS — Z13.1 SCREENING FOR DIABETES MELLITUS: ICD-10-CM

## 2025-04-07 LAB
CHOLEST SERPL-MCNC: 284 MG/DL
FASTING STATUS PATIENT QL REPORTED: YES
FASTING STATUS PATIENT QL REPORTED: YES
GLUCOSE SERPL-MCNC: 103 MG/DL (ref 70–99)
HDLC SERPL-MCNC: 42 MG/DL
LDLC SERPL CALC-MCNC: 217 MG/DL
NONHDLC SERPL-MCNC: 242 MG/DL
TRIGL SERPL-MCNC: 123 MG/DL

## 2025-04-07 PROCEDURE — 82947 ASSAY GLUCOSE BLOOD QUANT: CPT

## 2025-04-07 PROCEDURE — 36415 COLL VENOUS BLD VENIPUNCTURE: CPT

## 2025-04-07 PROCEDURE — 80061 LIPID PANEL: CPT

## 2025-04-08 DIAGNOSIS — I10 ESSENTIAL HYPERTENSION: ICD-10-CM

## 2025-04-08 RX ORDER — CHLORTHALIDONE 25 MG/1
25 TABLET ORAL DAILY
Qty: 90 TABLET | OUTPATIENT
Start: 2025-04-08

## 2025-04-08 RX ORDER — CHLORTHALIDONE 25 MG/1
25 TABLET ORAL DAILY
Qty: 30 TABLET | Refills: 0 | Status: SHIPPED | OUTPATIENT
Start: 2025-04-08

## 2025-04-14 ENCOUNTER — OFFICE VISIT (OUTPATIENT)
Dept: FAMILY MEDICINE | Facility: CLINIC | Age: 55
End: 2025-04-14
Payer: COMMERCIAL

## 2025-04-14 VITALS
RESPIRATION RATE: 16 BRPM | TEMPERATURE: 98.7 F | BODY MASS INDEX: 37.76 KG/M2 | HEART RATE: 69 BPM | WEIGHT: 240.6 LBS | HEIGHT: 67 IN | OXYGEN SATURATION: 96 % | SYSTOLIC BLOOD PRESSURE: 110 MMHG | DIASTOLIC BLOOD PRESSURE: 65 MMHG

## 2025-04-14 DIAGNOSIS — E78.5 HYPERLIPIDEMIA LDL GOAL <100: Primary | ICD-10-CM

## 2025-04-14 DIAGNOSIS — Z13.1 SCREENING FOR DIABETES MELLITUS: ICD-10-CM

## 2025-04-14 DIAGNOSIS — E66.01 MORBID OBESITY (H): Chronic | ICD-10-CM

## 2025-04-14 DIAGNOSIS — I10 ESSENTIAL HYPERTENSION: ICD-10-CM

## 2025-04-14 PROCEDURE — 99214 OFFICE O/P EST MOD 30 MIN: CPT | Performed by: PHYSICIAN ASSISTANT

## 2025-04-14 PROCEDURE — 3078F DIAST BP <80 MM HG: CPT | Performed by: PHYSICIAN ASSISTANT

## 2025-04-14 PROCEDURE — 3074F SYST BP LT 130 MM HG: CPT | Performed by: PHYSICIAN ASSISTANT

## 2025-04-14 PROCEDURE — 1126F AMNT PAIN NOTED NONE PRSNT: CPT | Performed by: PHYSICIAN ASSISTANT

## 2025-04-14 RX ORDER — CHLORTHALIDONE 25 MG/1
25 TABLET ORAL DAILY
Qty: 90 TABLET | Refills: 3 | Status: SHIPPED | OUTPATIENT
Start: 2025-04-14

## 2025-04-14 RX ORDER — LISINOPRIL 5 MG/1
5 TABLET ORAL DAILY
Qty: 90 TABLET | Refills: 3 | Status: SHIPPED | OUTPATIENT
Start: 2025-04-14

## 2025-04-14 RX ORDER — LISINOPRIL 20 MG/1
20 TABLET ORAL DAILY
Qty: 90 TABLET | Refills: 0 | Status: CANCELLED | OUTPATIENT
Start: 2025-04-14

## 2025-04-14 RX ORDER — FLUTICASONE PROPIONATE 50 MCG
2 SPRAY, SUSPENSION (ML) NASAL DAILY
Qty: 9.9 ML | Refills: 0 | Status: CANCELLED | OUTPATIENT
Start: 2025-04-14

## 2025-04-14 ASSESSMENT — PAIN SCALES - GENERAL: PAINLEVEL_OUTOF10: NO PAIN (0)

## 2025-04-14 NOTE — PROGRESS NOTES
Assessment & Plan     Hyperlipidemia LDL goal <100  For now he would like to do a dietary referral he has already made changes to the high fat content in his diet.  He would like to hold off on adding a statin despite his LDL being over 190.  He will check his labs in 3 months and we will do a virtual visit to follow-up on the results.  Could consider rosuvastatin 5 mg checking total CK carefully due to his mom's history of myositis related to statins, and his apprehension, also considering CT coronary artery scan at that point I offered to do this at this time however he would like to wait on this until his recheck.  - Adult Nutrition  Referral; Future  - Lipid panel reflex to direct LDL Fasting; Future    Essential hypertension  He has already cut down his dosage at home to 10 mg, further reduce it to 5 mg and continue same dosage of chlorthalidone as he continues to lose weight may need to reduce chlorthalidone dosage as well  - chlorthalidone (HYGROTON) 25 MG tablet; Take 1 tablet (25 mg) by mouth daily.  - lisinopril (ZESTRIL) 5 MG tablet; Take 1 tablet (5 mg) by mouth daily.    Morbid obesity (H)  Continue with dietary and exercise changes to continue weight loss dietary can also help her with this  - Adult Nutrition  Referral; Future    Screening for diabetes mellitus  Minimal elevation in blood sugars we will recheck in 3 months  - Glucose; Future        This chart documentation was completed in part with Dragon voice recognition software.  Documentation is reviewed after completion, however, some words and grammatical errors may remain.  Latricia Jewell PA-C      Pedro Caldera is a 54 year old, presenting for the following health issues:  Lipids        4/14/2025     8:19 AM   Additional Questions   Roomed by AG   Accompanied by self     History of Present Illness       Hyperlipidemia:  He presents for follow up of hyperlipidemia.   He is not taking medication to lower cholesterol.  He is not having myalgia or other side effects to statin medications.    He eats 0-1 servings of fruits and vegetables daily.He consumes 0 sweetened beverage(s) daily.He exercises with enough effort to increase his heart rate 60 or more minutes per day.  He exercises with enough effort to increase his heart rate 5 days per week. He is missing 7 dose(s) of medications per week.          Hyperlipidemia Follow-Up--at his recent labs his LDL came back at 217.  I offered to start him on statins immediately per the guidelines though he wanted to further discuss at this appointment today.  He is concerned about the statin drug class because his mother was on statins and developed myositis which she is still dealing with 2 years later.  He had gone to a high-protein diet to help him lose weight.  He is fasting 16 hours a day in the evening for 8 hours of the day he has lost 56 pounds since approximately January, weight from March 12, 2024 was 296 pounds.  He is ago to snack food was chicken wings with skin and ranch.  He has been eating more cheese and a lot of eggs and sausage in the form of egg bites.  He is already started to eat chicken breasts instead of chicken wings and is reading labels trying to avoid saturated and trans fats.  He drinks primarily water.  With his weight loss, he is already noticed that his blood pressure has gotten too low on several occasions he has reduced his lisinopril to 10 mg.  His goal is to go off of all meds if he may safely do so.  His blood pressures are usually in the 120s to 70s but sometimes may be a bit lower.  He has not had chest pains, shortness of breath or any abdominal pain    Are you regularly taking any medication or supplement to lower your cholesterol?   No  Are you having muscle aches or other side effects that you think could be caused by your cholesterol lowering medication?  No  How many servings of fruits and vegetables do you eat daily?  0-1  On average, how many  "sweetened beverages do you drink each day (Examples: soda, juice, sweet tea, etc.  Do NOT count diet or artificially sweetened beverages)?   0  How many days per week do you exercise enough to make your heart beat faster? 5  How many minutes a day do you exercise enough to make your heart beat faster? 60 or more  How many days per week do you miss taking your medication? 0    He is concerned about his blood sugar this was slightly elevated at his last blood draw.  He is frustrated by this because he thought weight loss would improve this.      Review of Systems  Constitutional, HEENT, cardiovascular, pulmonary, gi and gu systems are negative, except as otherwise noted.      Objective    /65 (BP Location: Left arm, Patient Position: Chair, Cuff Size: Adult Large)   Pulse 69   Temp 98.7  F (37.1  C) (Temporal)   Resp 16   Ht 1.7 m (5' 6.93\")   Wt 109.1 kg (240 lb 9.6 oz)   SpO2 96%   BMI 37.76 kg/m    Body mass index is 37.76 kg/m .  Physical Exam   GENERAL: alert and no distress  NECK: no adenopathy, no asymmetry, masses, or scars  RESP: lungs clear to auscultation - no rales, rhonchi or wheezes  CV: regular rate and rhythm, normal S1 S2, no S3 or S4, no murmur, click or rub, no peripheral edema  ABDOMEN: soft, nontender, no hepatosplenomegaly, no masses and bowel sounds normal  MS: no gross musculoskeletal defects noted, no edema  PSYCH: mentation appears normal, affect normal/bright    Lab on 04/07/2025   Component Date Value Ref Range Status    Cholesterol 04/07/2025 284 (H)  <200 mg/dL Final    Triglycerides 04/07/2025 123  <150 mg/dL Final    Direct Measure HDL 04/07/2025 42  >=40 mg/dL Final    LDL Cholesterol Calculated 04/07/2025 217 (H)  <100 mg/dL Final    Non HDL Cholesterol 04/07/2025 242 (H)  <130 mg/dL Final    Patient Fasting > 8hrs? 04/07/2025 Yes   Final    Glucose 04/07/2025 103 (H)  70 - 99 mg/dL Final    Patient Fasting > 8hrs? 04/07/2025 Yes   Final           Signed Electronically " by: Latricia Jewell PA-C

## 2025-05-14 ENCOUNTER — HOSPITAL ENCOUNTER (OUTPATIENT)
Dept: NUTRITION | Facility: CLINIC | Age: 55
Discharge: HOME | End: 2025-05-14
Attending: PHYSICIAN ASSISTANT | Admitting: PHYSICIAN ASSISTANT
Payer: COMMERCIAL

## 2025-05-14 DIAGNOSIS — E66.01 MORBID OBESITY (H): Chronic | ICD-10-CM

## 2025-05-14 DIAGNOSIS — E78.5 HYPERLIPIDEMIA LDL GOAL <100: ICD-10-CM

## 2025-05-14 PROCEDURE — 97802 MEDICAL NUTRITION INDIV IN: CPT | Mod: GT,95 | Performed by: DIETITIAN, REGISTERED

## 2025-05-14 NOTE — PROGRESS NOTES
"Virtual Visit Details    Type of service:  Video Visit     Originating Location (pt. Location): Other parked car    Distant Location (provider location):  On-site  Platform used for Video Visit: [facility]     OUTPATIENT NUTRITION ASSESSMENT   REASON FOR ASSESSMENT  Ruperto Mcbride referred by Latricia Jewell PA-C for MNT related to   Hyperlipidemia LDL goal <100 [E78.5]      Morbid obesity (H) [E66.01]      .    Patient accompanied by self       ASSESSMENT   Nutrition History:  - Information obtained from patient.  - Patient is on a high protein diet at home. Patient has followed intermittent fasting with 8 hour eating window (11 AM-7 PM). Patient has lost weight and his lipid levels increased. Patient has been avoiding carbohydrates and dairy. Patient's wife is also trying to lose weight.     Protein only diet and has lost 30-40+ lbs    Likes cheese, ranch and sauces      Dislikes vegetables   No cauliflower; broccoli  Likes carrots cooked, tomatoes, brussels sprouts     Diet Recall:  Breakfast: skips    Lunch: protein shake (30 grams protein) + egg bites (egg beaters, cottage cheese, sausage/cheese) could add onion mushroom    Dinner: rotisserie chicken or chicken wings with ranch dressing with melted shredded cheese   Dinner: fish-flounder, cod, walleye, tilapia  + green beans (canned) or salad with cheese + dressing -ranch dressing; burger or steak   Snack: [employer] chips   Beverages: water  Dining out: varies          Exercise: walking 2 miles per day; pickleball      NUTRITION FOCUSED PHYSICAL ASSESSMENT (NFPA) FOR DIAGNOSING MALNUTRITION  Yes         Observed:   No nutrition-related physical findings observed    Obtained from Chart/Interdisciplinary Team:  None noted     LABS  Labs reviewed    MEDICATIONS  Medications reviewed    ANTHROPOMETRICS   Height: 5'6.93\"  Weight: 240 lbs   BMI (kg/m2): 37.6 kg/m2   Weight Status:  Obesity Grade II BMI 35-39.9  %IBW: 163%  Weight History: 15% weight loss in 3 months   Wt " Readings from Last 10 Encounters:   04/14/25 109.1 kg (240 lb 9.6 oz)   03/05/25 112.5 kg (248 lb)   01/06/25 128.2 kg (282 lb 9.6 oz)   01/03/25 128.1 kg (282 lb 8 oz)   03/12/24 134.6 kg (296 lb 11.2 oz)   08/31/23 114.8 kg (253 lb)   08/18/23 114.8 kg (253 lb)   07/24/23 114.9 kg (253 lb 3.2 oz)   07/19/23 116.8 kg (257 lb 9.6 oz)   07/19/23 115.5 kg (254 lb 9.6 oz)      ASSESSED NUTRITION NEEDS  Estimated Energy Needs: 3706-8726 kcals/day (14-17 Kcal/Kg)  Justification:  (obese)  Estimated Protein Needs: 77-92 grams protein/day (1-1.2 g pro/Kg)  Justification:  (preservation of lean body mass)  Estimated Fluid Needs: 2060-9729 mL/day (30-35 mL/kg)    ASSESSED MALNUTRITION STATUS  % Weight Loss:  > 7.5% in 3 months (severe malnutrition)  % Intake:  Decreased intake does not meet criteria for malnutrition   Subcutaneous Fat Loss:  None observed  Loss of Muscle Mass:  None observed  Fluid Retention:  None noted    Malnutrition Diagnosis:  Patient does not meet two of the above criteria necessary for diagnosing malnutrition    DIAGNOSIS   Nutrition Diagnosis:  Food and nutrition-related knowledge deficit related to lack of prior exposure as evidenced by no previous need for food and nutrition related recommendations       INTERVENTIONS   Nutrition Prescription   Recommend modified diet for weight loss and lipid reduction       IMPLEMENTATION   Assessed learning needs and learning preference  Teaching Method(s) used: Booklet / Handout  Explanation  Vitamin and Mineral Supplements: recommend complete multivitamin and mineral   Diet Education: Provided education on heart healthy diet   Nutrition Education (Content):   a)  Discussed heart healthy diet, portion sizes, label reading, carbohydrate counting, exercise and behavior modification. Instructed patient on label reading using label from home. Discussed Mediterranean diet and encouraged 2 servings of omega 3 fatty acids per day, 2-3 T olive oil per day and 1 oz nuts  (walnuts, almonds and hazelnuts) per day. Suggest add fruit and/or vegetable to all meals and snacks. Discussed inclusion of carbohydrates into diet. Recommend reduction in red meat consumption due to TMAO production. Encouraged gradual diet and behavior change for long term weight loss success.  Supported patient with the challenge of diet changes.    b)  Provided [employer] Mediterranean Nutrition Therapy; My Plate   Nutrition Education (Application):   a)  Discussed current eating plans and offered suggestions for food options    b)  Patient verbalizes understanding of diet by stating will add vegetables into diet    Expected patient engagement: good     GOALS  2-3 T olive oil per day  1 oz nuts (walnuts, almonds and hazelnuts)  Eat 2 servings omega 3 fatty acids weekly  Include fruit and/or vegetable at every meal     FOLLOW UP/MONITORING   Progress towards goals will be monitored and evaluated per protocol and Practice Guidelines  Patient to call for follow up appointment or if has further questions  RD name and number provided     Time Spent with Patient  48 minutes     Jenna Crabtree, RD, LD  [facility] Outpatient Dietitian  [phone] (office phone)

## 2025-05-15 NOTE — DISCHARGE INSTRUCTIONS
Gavin Caldera,    It was nice meeting you today. Congratulations on your weight loss thus far. Below are some of our discussion points:     Add 2-3 T olive oil per day  1 oz nuts (walnuts, almonds and hazelnuts) -try mixing with the nuts you do like to eat   Eat 2 servings omega 3 fatty acids weekly (salmon, tuna)   Include fruit and/or vegetable at every meal  Aim for 45 grams carbohydrate per meal   Incorporate watermelon (1 cup serving) into diet multiple times per week  Add berries in the AM with egg beaters and veggies   Limit rotisserie chicken due to high fat content    Limit red meat consumption to 1-2 times per week    My Plate -carbohydrates portions on second page    Please let me know if you have any questions. I've mailed out the Mediterranean diet which list foods to include in your diet.     Take Jaja dsouza MEd, RD, LD  [facility] Outpatient Dietitian  [phone] (office phone)

## 2025-06-16 ENCOUNTER — TRANSFERRED RECORDS (OUTPATIENT)
Dept: HEALTH INFORMATION MANAGEMENT | Facility: CLINIC | Age: 55
End: 2025-06-16
Payer: COMMERCIAL

## 2025-06-30 ENCOUNTER — TRANSFERRED RECORDS (OUTPATIENT)
Dept: HEALTH INFORMATION MANAGEMENT | Facility: CLINIC | Age: 55
End: 2025-06-30
Payer: COMMERCIAL

## (undated) DEVICE — DAVINCI XI DRAPE COLUMN 470341

## (undated) DEVICE — DRAPE U SPLIT 74X120" 29440

## (undated) DEVICE — KIT ENDO TURNOVER/PROCEDURE CARRY-ON 101822

## (undated) DEVICE — GLOVE BIOGEL PI INDICATOR 8.0 LF 41680

## (undated) DEVICE — NDL INSUFFLATION 120MM VERRES 172015

## (undated) DEVICE — SU DERMABOND ADVANCED .7ML DNX12

## (undated) DEVICE — BINDER ABD 12IN 4 PNL ELC CNTCT CLSR PRCR 62-74IN 79-89220

## (undated) DEVICE — PACK GENERAL LAPAOSCOPY

## (undated) DEVICE — DEVICE SUTURE GRASPER TROCAR CLOSURE 14GA PMITCSG

## (undated) DEVICE — ENDO TRAP POLYP E-TRAP 00711099

## (undated) DEVICE — SU STRATAFIX PDS PLUS 0 CT-2 9" SXPP1A446

## (undated) DEVICE — GLOVE BIOGEL PI ULTRATOUCH G SZ 7.5 42175

## (undated) DEVICE — GOWN XLG DISP 9545

## (undated) DEVICE — KIT PATIENT POSITIONING PIGAZZI LATEX FREE 40580

## (undated) DEVICE — DRSG GAUZE 4X4" 2187

## (undated) DEVICE — DAVINCI XI OBTURATOR BLADELESS 8MM 470359

## (undated) DEVICE — DAVINCI HOT SHEARS TIP COVER  400180

## (undated) DEVICE — ENDO POUCH UNIVERSAL RETRIEVAL SYSTEM INZII 5MM CD003

## (undated) DEVICE — Device

## (undated) DEVICE — DRSG TEGADERM 4X4 3/4" 1626W

## (undated) DEVICE — TUBING SUCTION 6"X3/16" N56A

## (undated) DEVICE — SYR 50ML SLIP TIP W/O NDL 309654

## (undated) DEVICE — SYSTEM LAPAROVUE VISIBILITY LAPVUE10

## (undated) DEVICE — SOL WATER IRRIG 1000ML BOTTLE 2F7114

## (undated) DEVICE — DAVINCI XI SEAL UNIVERSAL 5-8MM 470361

## (undated) DEVICE — SU MONOCRYL 4-0 PS-2 18" UND Y496G

## (undated) DEVICE — DAVINCI XI DRAPE ARM 470015

## (undated) RX ORDER — PROPOFOL 10 MG/ML
INJECTION, EMULSION INTRAVENOUS
Status: DISPENSED
Start: 2023-08-18

## (undated) RX ORDER — LIDOCAINE HYDROCHLORIDE 10 MG/ML
INJECTION, SOLUTION EPIDURAL; INFILTRATION; INTRACAUDAL; PERINEURAL
Status: DISPENSED
Start: 2023-08-18

## (undated) RX ORDER — HYDROMORPHONE HYDROCHLORIDE 1 MG/ML
INJECTION, SOLUTION INTRAMUSCULAR; INTRAVENOUS; SUBCUTANEOUS
Status: DISPENSED
Start: 2023-08-18

## (undated) RX ORDER — BUPIVACAINE HYDROCHLORIDE AND EPINEPHRINE 2.5; 5 MG/ML; UG/ML
INJECTION, SOLUTION EPIDURAL; INFILTRATION; INTRACAUDAL; PERINEURAL
Status: DISPENSED
Start: 2023-08-18

## (undated) RX ORDER — KETOROLAC TROMETHAMINE 30 MG/ML
INJECTION, SOLUTION INTRAMUSCULAR; INTRAVENOUS
Status: DISPENSED
Start: 2023-08-18

## (undated) RX ORDER — FENTANYL CITRATE 50 UG/ML
INJECTION, SOLUTION INTRAMUSCULAR; INTRAVENOUS
Status: DISPENSED
Start: 2023-08-18

## (undated) RX ORDER — ONDANSETRON 2 MG/ML
INJECTION INTRAMUSCULAR; INTRAVENOUS
Status: DISPENSED
Start: 2023-08-18